# Patient Record
Sex: MALE | NOT HISPANIC OR LATINO | Employment: FULL TIME | URBAN - METROPOLITAN AREA
[De-identification: names, ages, dates, MRNs, and addresses within clinical notes are randomized per-mention and may not be internally consistent; named-entity substitution may affect disease eponyms.]

---

## 2023-02-10 PROBLEM — Z95.1 HISTORY OF CORONARY ARTERY BYPASS SURGERY: Status: ACTIVE | Noted: 2023-02-10

## 2023-02-10 PROBLEM — E78.5 HYPERLIPIDEMIA: Status: ACTIVE | Noted: 2023-02-10

## 2023-02-10 PROBLEM — U07.1 LAB TEST POSITIVE FOR DETECTION OF COVID-19 VIRUS: Status: ACTIVE | Noted: 2023-02-10

## 2023-02-10 PROBLEM — I25.10 CAD, MULTIPLE VESSEL: Status: ACTIVE | Noted: 2023-02-10

## 2023-02-10 PROBLEM — D53.9 ANEMIA, DEFICIENCY: Status: ACTIVE | Noted: 2023-02-10

## 2023-02-10 PROBLEM — M70.20 BURSITIS, OLECRANON: Status: ACTIVE | Noted: 2023-02-10

## 2023-02-10 PROBLEM — R93.1 HIGH CORONARY ARTERY CALCIUM SCORE: Status: ACTIVE | Noted: 2023-02-10

## 2023-02-10 PROBLEM — E11.9 DIABETES MELLITUS (MULTI): Status: ACTIVE | Noted: 2023-02-10

## 2023-02-10 PROBLEM — I10 BENIGN ESSENTIAL HTN: Status: ACTIVE | Noted: 2023-02-10

## 2023-02-10 PROBLEM — R09.82 POSTNASAL DRIP: Status: ACTIVE | Noted: 2023-02-10

## 2023-02-10 PROBLEM — I82.433 ACUTE DEEP VEIN THROMBOSIS (DVT) OF POPLITEAL VEIN OF BOTH LOWER EXTREMITIES (MULTI): Status: ACTIVE | Noted: 2023-02-10

## 2023-02-10 PROBLEM — E87.1 HYPONATREMIA: Status: ACTIVE | Noted: 2023-02-10

## 2023-02-10 PROBLEM — M79.89 SWELLING OF RIGHT LOWER EXTREMITY: Status: ACTIVE | Noted: 2023-02-10

## 2023-02-10 PROBLEM — J30.9 ALLERGIC RHINITIS: Status: ACTIVE | Noted: 2023-02-10

## 2023-02-10 PROBLEM — E08.01: Status: ACTIVE | Noted: 2023-02-10

## 2023-02-10 PROBLEM — M10.9 GOUT: Status: ACTIVE | Noted: 2023-02-10

## 2023-02-10 RX ORDER — METOPROLOL TARTRATE 25 MG/1
1 TABLET, FILM COATED ORAL DAILY
COMMUNITY
End: 2023-03-06 | Stop reason: SDUPTHER

## 2023-02-10 RX ORDER — ATORVASTATIN CALCIUM 40 MG/1
1 TABLET, FILM COATED ORAL DAILY
COMMUNITY
Start: 2021-09-21 | End: 2023-03-06 | Stop reason: SDUPTHER

## 2023-02-10 RX ORDER — ASPIRIN 81 MG/1
1 TABLET ORAL DAILY
COMMUNITY

## 2023-02-10 RX ORDER — LISINOPRIL 10 MG/1
1 TABLET ORAL DAILY
COMMUNITY
Start: 2018-01-16 | End: 2023-03-06 | Stop reason: SDUPTHER

## 2023-02-10 RX ORDER — METFORMIN HYDROCHLORIDE 1000 MG/1
1000 TABLET ORAL
COMMUNITY
Start: 2016-08-08 | End: 2023-03-06 | Stop reason: SDUPTHER

## 2023-02-27 LAB
ANION GAP IN SER/PLAS: 16 MMOL/L (ref 10–20)
CALCIUM (MG/DL) IN SER/PLAS: 10 MG/DL (ref 8.6–10.3)
CARBON DIOXIDE, TOTAL (MMOL/L) IN SER/PLAS: 26 MMOL/L (ref 21–32)
CHLORIDE (MMOL/L) IN SER/PLAS: 98 MMOL/L (ref 98–107)
CREATININE (MG/DL) IN SER/PLAS: 0.94 MG/DL (ref 0.5–1.3)
ESTIMATED AVERAGE GLUCOSE FOR HBA1C: 206 MG/DL
GFR MALE: 88 ML/MIN/1.73M2
GLUCOSE (MG/DL) IN SER/PLAS: 169 MG/DL (ref 74–99)
HEMOGLOBIN A1C/HEMOGLOBIN TOTAL IN BLOOD: 8.8 %
POTASSIUM (MMOL/L) IN SER/PLAS: 5 MMOL/L (ref 3.5–5.3)
SODIUM (MMOL/L) IN SER/PLAS: 135 MMOL/L (ref 136–145)
UREA NITROGEN (MG/DL) IN SER/PLAS: 16 MG/DL (ref 6–23)

## 2023-03-05 ENCOUNTER — DOCUMENTATION (OUTPATIENT)
Dept: PRIMARY CARE | Facility: CLINIC | Age: 68
End: 2023-03-05
Payer: MEDICARE

## 2023-03-06 ENCOUNTER — OFFICE VISIT (OUTPATIENT)
Dept: PRIMARY CARE | Facility: CLINIC | Age: 68
End: 2023-03-06
Payer: COMMERCIAL

## 2023-03-06 VITALS
DIASTOLIC BLOOD PRESSURE: 60 MMHG | WEIGHT: 125.8 LBS | OXYGEN SATURATION: 100 % | HEART RATE: 82 BPM | BODY MASS INDEX: 20.96 KG/M2 | SYSTOLIC BLOOD PRESSURE: 126 MMHG | HEIGHT: 65 IN

## 2023-03-06 DIAGNOSIS — I25.10 CAD, MULTIPLE VESSEL: ICD-10-CM

## 2023-03-06 DIAGNOSIS — Z00.00 MEDICARE ANNUAL WELLNESS VISIT, INITIAL: Primary | ICD-10-CM

## 2023-03-06 DIAGNOSIS — Z13.0 SCREENING, ANEMIA, DEFICIENCY, IRON: ICD-10-CM

## 2023-03-06 DIAGNOSIS — E78.5 HYPERLIPIDEMIA, UNSPECIFIED HYPERLIPIDEMIA TYPE: ICD-10-CM

## 2023-03-06 DIAGNOSIS — I10 BENIGN ESSENTIAL HTN: ICD-10-CM

## 2023-03-06 DIAGNOSIS — Z00.00 MEDICARE ANNUAL WELLNESS VISIT, SUBSEQUENT: ICD-10-CM

## 2023-03-06 DIAGNOSIS — E11.49 OTHER DIABETIC NEUROLOGICAL COMPLICATION ASSOCIATED WITH TYPE 2 DIABETES MELLITUS (MULTI): ICD-10-CM

## 2023-03-06 PROBLEM — R09.82 POSTNASAL DRIP: Status: RESOLVED | Noted: 2023-02-10 | Resolved: 2023-03-06

## 2023-03-06 PROBLEM — E87.1 HYPONATREMIA: Status: RESOLVED | Noted: 2023-02-10 | Resolved: 2023-03-06

## 2023-03-06 PROBLEM — M70.20 BURSITIS, OLECRANON: Status: RESOLVED | Noted: 2023-02-10 | Resolved: 2023-03-06

## 2023-03-06 PROBLEM — E11.40 DIABETIC NEUROPATHY ASSOCIATED WITH TYPE 2 DIABETES MELLITUS (MULTI): Status: ACTIVE | Noted: 2023-03-06

## 2023-03-06 PROBLEM — M79.89 SWELLING OF RIGHT LOWER EXTREMITY: Status: RESOLVED | Noted: 2023-02-10 | Resolved: 2023-03-06

## 2023-03-06 PROBLEM — D53.9 ANEMIA, DEFICIENCY: Status: RESOLVED | Noted: 2023-02-10 | Resolved: 2023-03-06

## 2023-03-06 PROBLEM — E08.01: Status: RESOLVED | Noted: 2023-02-10 | Resolved: 2023-03-06

## 2023-03-06 PROBLEM — M10.9 GOUT: Status: RESOLVED | Noted: 2023-02-10 | Resolved: 2023-03-06

## 2023-03-06 PROBLEM — I82.433 ACUTE DEEP VEIN THROMBOSIS (DVT) OF POPLITEAL VEIN OF BOTH LOWER EXTREMITIES (MULTI): Status: RESOLVED | Noted: 2023-02-10 | Resolved: 2023-03-06

## 2023-03-06 PROCEDURE — 3066F NEPHROPATHY DOC TX: CPT | Performed by: INTERNAL MEDICINE

## 2023-03-06 PROCEDURE — 1159F MED LIST DOCD IN RCRD: CPT | Performed by: INTERNAL MEDICINE

## 2023-03-06 PROCEDURE — 3078F DIAST BP <80 MM HG: CPT | Performed by: INTERNAL MEDICINE

## 2023-03-06 PROCEDURE — 4010F ACE/ARB THERAPY RXD/TAKEN: CPT | Performed by: INTERNAL MEDICINE

## 2023-03-06 PROCEDURE — 1170F FXNL STATUS ASSESSED: CPT | Performed by: INTERNAL MEDICINE

## 2023-03-06 PROCEDURE — 1036F TOBACCO NON-USER: CPT | Performed by: INTERNAL MEDICINE

## 2023-03-06 PROCEDURE — 99215 OFFICE O/P EST HI 40 MIN: CPT | Performed by: INTERNAL MEDICINE

## 2023-03-06 PROCEDURE — 3052F HG A1C>EQUAL 8.0%<EQUAL 9.0%: CPT | Performed by: INTERNAL MEDICINE

## 2023-03-06 PROCEDURE — 3074F SYST BP LT 130 MM HG: CPT | Performed by: INTERNAL MEDICINE

## 2023-03-06 PROCEDURE — 1160F RVW MEDS BY RX/DR IN RCRD: CPT | Performed by: INTERNAL MEDICINE

## 2023-03-06 RX ORDER — ATORVASTATIN CALCIUM 40 MG/1
40 TABLET, FILM COATED ORAL DAILY
Qty: 180 TABLET | Refills: 2 | Status: SHIPPED | OUTPATIENT
Start: 2023-03-06 | End: 2023-11-13 | Stop reason: SDUPTHER

## 2023-03-06 RX ORDER — METFORMIN HYDROCHLORIDE 1000 MG/1
1000 TABLET ORAL
Qty: 180 TABLET | Refills: 2 | Status: SHIPPED | OUTPATIENT
Start: 2023-03-06 | End: 2023-06-07 | Stop reason: SDUPTHER

## 2023-03-06 RX ORDER — LISINOPRIL 10 MG/1
10 TABLET ORAL DAILY
Qty: 90 TABLET | Refills: 1 | Status: SHIPPED | OUTPATIENT
Start: 2023-03-06 | End: 2023-07-24 | Stop reason: SDUPTHER

## 2023-03-06 RX ORDER — METOPROLOL TARTRATE 25 MG/1
25 TABLET, FILM COATED ORAL 2 TIMES DAILY
Qty: 180 TABLET | Refills: 1 | Status: SHIPPED | OUTPATIENT
Start: 2023-03-06 | End: 2023-11-13 | Stop reason: SDUPTHER

## 2023-03-06 ASSESSMENT — ENCOUNTER SYMPTOMS
OCCASIONAL FEELINGS OF UNSTEADINESS: 0
LOSS OF SENSATION IN FEET: 0
DEPRESSION: 0

## 2023-03-08 ASSESSMENT — ENCOUNTER SYMPTOMS
ALLERGIC/IMMUNOLOGIC NEGATIVE: 1
EYES NEGATIVE: 1
GASTROINTESTINAL NEGATIVE: 1
ENDOCRINE NEGATIVE: 1
CONSTITUTIONAL NEGATIVE: 1
HEMATOLOGIC/LYMPHATIC NEGATIVE: 1
CARDIOVASCULAR NEGATIVE: 1
RESPIRATORY NEGATIVE: 1
NEUROLOGICAL NEGATIVE: 1
MUSCULOSKELETAL NEGATIVE: 1

## 2023-03-08 ASSESSMENT — ACTIVITIES OF DAILY LIVING (ADL)
JUDGMENT_ADEQUATE_SAFELY_COMPLETE_DAILY_ACTIVITIES: YES
PATIENT'S MEMORY ADEQUATE TO SAFELY COMPLETE DAILY ACTIVITIES?: YES
EATING: INDEPENDENT
DRESSING: INDEPENDENT
FEEDING YOURSELF: INDEPENDENT
BATHING: INDEPENDENT
DOING_HOUSEWORK: INDEPENDENT
TAKING_MEDICATION: INDEPENDENT
GROCERY_SHOPPING: INDEPENDENT
GROOMING: INDEPENDENT
TAKING_MEDICATION: INDEPENDENT
ADEQUATE_TO_COMPLETE_ADL: YES
WALKS IN HOME: INDEPENDENT
DOING_HOUSEWORK: INDEPENDENT
USING TRANSPORTATION: INDEPENDENT
BATHING: INDEPENDENT
TOILETING: INDEPENDENT
MANAGING_FINANCES: INDEPENDENT
GROCERY_SHOPPING: INDEPENDENT
DRESSING YOURSELF: INDEPENDENT
ADEQUATE_TO_COMPLETE_ADL: YES
JUDGMENT_ADEQUATE_SAFELY_COMPLETE_DAILY_ACTIVITIES: YES
STIL DRIVING: YES

## 2023-03-08 NOTE — PROGRESS NOTES
Advance Care Planning Note     Discussion Date: 03/08/23   Discussion Participants: patient    The patient wishes to discuss Advance Care Planning today and the following is a brief summary of our discussion.     Patient has capacity to make their own medical decisions: Yes  Health Care Agent/Surrogate Decision Maker documented in chart:  was not discussed with him on the visit    Documents on file and valid:  Advance Directive/Living Will: No   Health Care Power of : No  Other: na    Communication of Medical Status/Prognosis:   na     Communication of Treatment Goals/Options:   na     Treatment Decisions  na    Time Statement: Total face to face time spent on advance care planning was 0 minutes with 0 minutes spent in counseling, including the explanation.    Valentino Davila MD  3/8/2023 11:31 AM

## 2023-03-08 NOTE — PROGRESS NOTES
"Subjective   Reason for Visit: Daniele Livingston is an 67 y.o. male here for a Medicare Wellness visit.          Reviewed all medications by prescribing practitioner or clinical pharmacist (such as prescriptions, OTCs, herbal therapies and supplements) and documented in the medical record.    HPI    Here for subsequent Medicare wellness visit  He does not have any complain  He does not have any concern      Patient Self Assessment of Health Status  Patient Self Assessment: Excellent    Nutrition and Exercise  Current Diet: Well Balanced Diet  Adequate Fluid Intake: Yes  Caffeine: No  Exercise Frequency: Regularly    Functional Ability/Level of Safety  Cognitive Impairment Observed: No cognitive impairment observed    Home Safety Risk Factors: None    Patient Care Team:  Valentino Davila MD as PCP - General  Valentino Davila MD as PCP - [facility] PCP     Review of Systems   Constitutional: Negative.    HENT: Negative.     Eyes: Negative.    Respiratory: Negative.     Cardiovascular: Negative.    Gastrointestinal: Negative.    Endocrine: Negative.    Genitourinary: Negative.    Musculoskeletal: Negative.    Skin: Negative.    Allergic/Immunologic: Negative.    Neurological: Negative.    Hematological: Negative.    All other systems reviewed and are negative.      Objective   Vitals:  /60   Pulse 82   Ht 1.651 m (5' 5\")   Wt 57.1 kg (125 lb 12.8 oz)   SpO2 100%   BMI 20.93 kg/m²       Physical Exam  Vitals and nursing note reviewed.   Constitutional:       Appearance: Normal appearance.   HENT:      Head: Normocephalic and atraumatic.      Mouth/Throat:      Mouth: Mucous membranes are moist.      Pharynx: Oropharynx is clear.   Cardiovascular:      Rate and Rhythm: Normal rate and regular rhythm.      Pulses: Normal pulses.      Heart sounds: Normal heart sounds.   Pulmonary:      Effort: Pulmonary effort is normal.      Breath sounds: Normal breath sounds.   Abdominal:      General: Abdomen is flat. "   Musculoskeletal:         General: Normal range of motion.      Cervical back: Neck supple.   Skin:     General: Skin is warm.      Capillary Refill: Capillary refill takes more than 3 seconds.   Neurological:      General: No focal deficit present.      Mental Status: He is alert and oriented to person, place, and time.   Psychiatric:         Mood and Affect: Mood normal.         Behavior: Behavior normal.         Thought Content: Thought content normal.         Assessment/Plan   Problem List Items Addressed This Visit          Nervous    Diabetic neuropathy associated with type 2 diabetes mellitus (CMS/[facility])    Relevant Medications    empagliflozin (Jardiance) 25 mg    lisinopril 10 mg tablet    metFORMIN (Glucophage) 1,000 mg tablet    Other Relevant Orders    Comprehensive metabolic panel    Hemoglobin A1c    Albumin, urine, random       Circulatory    Benign essential HTN    Relevant Medications    lisinopril 10 mg tablet    Other Relevant Orders    Comprehensive metabolic panel    CAD, multiple vessel    Relevant Medications    atorvastatin (Lipitor) 40 mg tablet    lisinopril 10 mg tablet    metoprolol tartrate (Lopressor) 25 mg tablet       Other    Hyperlipidemia    Relevant Medications    atorvastatin (Lipitor) 40 mg tablet    Other Relevant Orders    Lipid panel     Other Visit Diagnoses       Medicare annual wellness visit, initial    -  Primary    Screening, anemia, deficiency, iron        Relevant Orders    CBC and Auto Differential    Medicare annual wellness visit, subsequent

## 2023-05-03 DIAGNOSIS — E11.49 OTHER DIABETIC NEUROLOGICAL COMPLICATION ASSOCIATED WITH TYPE 2 DIABETES MELLITUS (MULTI): ICD-10-CM

## 2023-05-03 NOTE — TELEPHONE ENCOUNTER
Pt. Informed. States he has a new pharmacy. It is [facility] on [address]. Asking if you can switch his medication to that pharmacy. Also states did blood work in February, but there are orders for 3/6/23. Which blood work would you like him to do?

## 2023-06-05 DIAGNOSIS — E11.49 OTHER DIABETIC NEUROLOGICAL COMPLICATION ASSOCIATED WITH TYPE 2 DIABETES MELLITUS (MULTI): ICD-10-CM

## 2023-06-07 DIAGNOSIS — E11.49 OTHER DIABETIC NEUROLOGICAL COMPLICATION ASSOCIATED WITH TYPE 2 DIABETES MELLITUS (MULTI): ICD-10-CM

## 2023-06-07 RX ORDER — METFORMIN HYDROCHLORIDE 1000 MG/1
1000 TABLET ORAL
Qty: 180 TABLET | Refills: 2 | Status: SHIPPED | OUTPATIENT
Start: 2023-06-07 | End: 2023-06-08 | Stop reason: SDUPTHER

## 2023-06-08 DIAGNOSIS — E11.49 OTHER DIABETIC NEUROLOGICAL COMPLICATION ASSOCIATED WITH TYPE 2 DIABETES MELLITUS (MULTI): ICD-10-CM

## 2023-06-08 RX ORDER — METFORMIN HYDROCHLORIDE 1000 MG/1
1000 TABLET ORAL
Qty: 180 TABLET | Refills: 2 | Status: SHIPPED | OUTPATIENT
Start: 2023-06-08 | End: 2023-06-14 | Stop reason: SDUPTHER

## 2023-06-11 RX ORDER — METFORMIN HYDROCHLORIDE 1000 MG/1
TABLET ORAL
Qty: 225 TABLET | Refills: 0 | Status: SHIPPED | OUTPATIENT
Start: 2023-06-11 | End: 2023-06-14 | Stop reason: SDUPTHER

## 2023-06-14 DIAGNOSIS — E11.49 OTHER DIABETIC NEUROLOGICAL COMPLICATION ASSOCIATED WITH TYPE 2 DIABETES MELLITUS (MULTI): ICD-10-CM

## 2023-06-14 RX ORDER — METFORMIN HYDROCHLORIDE 1000 MG/1
1000 TABLET ORAL SEE ADMIN INSTRUCTIONS
Qty: 180 TABLET | Refills: 2 | Status: SHIPPED | OUTPATIENT
Start: 2023-06-14 | End: 2024-03-27 | Stop reason: SDUPTHER

## 2023-06-14 RX ORDER — METFORMIN HYDROCHLORIDE 1000 MG/1
1000 TABLET ORAL
Qty: 180 TABLET | Refills: 1 | Status: SHIPPED | OUTPATIENT
Start: 2023-06-14 | End: 2023-11-09 | Stop reason: SDUPTHER

## 2023-07-24 DIAGNOSIS — E11.49 OTHER DIABETIC NEUROLOGICAL COMPLICATION ASSOCIATED WITH TYPE 2 DIABETES MELLITUS (MULTI): ICD-10-CM

## 2023-07-24 DIAGNOSIS — I10 BENIGN ESSENTIAL HTN: ICD-10-CM

## 2023-07-24 DIAGNOSIS — I25.10 CAD, MULTIPLE VESSEL: ICD-10-CM

## 2023-07-24 RX ORDER — LISINOPRIL 10 MG/1
10 TABLET ORAL DAILY
Qty: 90 TABLET | Refills: 1 | Status: SHIPPED | OUTPATIENT
Start: 2023-07-24 | End: 2023-11-13 | Stop reason: SDUPTHER

## 2023-11-09 DIAGNOSIS — E11.49 OTHER DIABETIC NEUROLOGICAL COMPLICATION ASSOCIATED WITH TYPE 2 DIABETES MELLITUS (MULTI): ICD-10-CM

## 2023-11-09 RX ORDER — METFORMIN HYDROCHLORIDE 1000 MG/1
1000 TABLET ORAL
Qty: 60 TABLET | Refills: 0 | Status: SHIPPED | OUTPATIENT
Start: 2023-11-09 | End: 2023-11-13 | Stop reason: SDUPTHER

## 2023-11-10 ENCOUNTER — LAB (OUTPATIENT)
Dept: LAB | Facility: LAB | Age: 68
End: 2023-11-10
Payer: MEDICARE

## 2023-11-10 DIAGNOSIS — Z13.0 SCREENING, ANEMIA, DEFICIENCY, IRON: ICD-10-CM

## 2023-11-10 DIAGNOSIS — E11.49 OTHER DIABETIC NEUROLOGICAL COMPLICATION ASSOCIATED WITH TYPE 2 DIABETES MELLITUS (MULTI): ICD-10-CM

## 2023-11-10 DIAGNOSIS — E78.5 HYPERLIPIDEMIA, UNSPECIFIED HYPERLIPIDEMIA TYPE: ICD-10-CM

## 2023-11-10 DIAGNOSIS — I10 BENIGN ESSENTIAL HTN: ICD-10-CM

## 2023-11-10 LAB
ALBUMIN SERPL BCP-MCNC: 4.3 G/DL (ref 3.4–5)
ALP SERPL-CCNC: 92 U/L (ref 33–136)
ALT SERPL W P-5'-P-CCNC: 24 U/L (ref 10–52)
ANION GAP SERPL CALC-SCNC: 15 MMOL/L (ref 10–20)
AST SERPL W P-5'-P-CCNC: 11 U/L (ref 9–39)
BASOPHILS # BLD AUTO: 0.13 X10*3/UL (ref 0–0.1)
BASOPHILS NFR BLD AUTO: 1.7 %
BILIRUB SERPL-MCNC: 0.5 MG/DL (ref 0–1.2)
BUN SERPL-MCNC: 16 MG/DL (ref 6–23)
CALCIUM SERPL-MCNC: 9.6 MG/DL (ref 8.6–10.3)
CHLORIDE SERPL-SCNC: 99 MMOL/L (ref 98–107)
CHOLEST SERPL-MCNC: 145 MG/DL (ref 0–199)
CHOLESTEROL/HDL RATIO: 3.6
CO2 SERPL-SCNC: 28 MMOL/L (ref 21–32)
CREAT SERPL-MCNC: 0.89 MG/DL (ref 0.5–1.3)
CREAT UR-MCNC: 60 MG/DL (ref 20–370)
EOSINOPHIL # BLD AUTO: 0.75 X10*3/UL (ref 0–0.7)
EOSINOPHIL NFR BLD AUTO: 9.7 %
ERYTHROCYTE [DISTWIDTH] IN BLOOD BY AUTOMATED COUNT: 13 % (ref 11.5–14.5)
EST. AVERAGE GLUCOSE BLD GHB EST-MCNC: 203 MG/DL
GFR SERPL CREATININE-BSD FRML MDRD: >90 ML/MIN/1.73M*2
GLUCOSE SERPL-MCNC: 176 MG/DL (ref 74–99)
HBA1C MFR BLD: 8.7 %
HCT VFR BLD AUTO: 47.7 % (ref 41–52)
HDLC SERPL-MCNC: 40.4 MG/DL
HGB BLD-MCNC: 15.3 G/DL (ref 13.5–17.5)
IMM GRANULOCYTES # BLD AUTO: 0.03 X10*3/UL (ref 0–0.7)
IMM GRANULOCYTES NFR BLD AUTO: 0.4 % (ref 0–0.9)
LDLC SERPL CALC-MCNC: 80 MG/DL
LYMPHOCYTES # BLD AUTO: 1.98 X10*3/UL (ref 1.2–4.8)
LYMPHOCYTES NFR BLD AUTO: 25.7 %
MCH RBC QN AUTO: 29.4 PG (ref 26–34)
MCHC RBC AUTO-ENTMCNC: 32.1 G/DL (ref 32–36)
MCV RBC AUTO: 92 FL (ref 80–100)
MICROALBUMIN UR-MCNC: <7 MG/L
MICROALBUMIN/CREAT UR: NORMAL MG/G{CREAT}
MONOCYTES # BLD AUTO: 0.76 X10*3/UL (ref 0.1–1)
MONOCYTES NFR BLD AUTO: 9.9 %
NEUTROPHILS # BLD AUTO: 4.06 X10*3/UL (ref 1.2–7.7)
NEUTROPHILS NFR BLD AUTO: 52.6 %
NON HDL CHOLESTEROL: 105 MG/DL (ref 0–149)
NRBC BLD-RTO: 0 /100 WBCS (ref 0–0)
PLATELET # BLD AUTO: 353 X10*3/UL (ref 150–450)
POTASSIUM SERPL-SCNC: 5 MMOL/L (ref 3.5–5.3)
PROT SERPL-MCNC: 6.8 G/DL (ref 6.4–8.2)
RBC # BLD AUTO: 5.2 X10*6/UL (ref 4.5–5.9)
SODIUM SERPL-SCNC: 137 MMOL/L (ref 136–145)
TRIGL SERPL-MCNC: 121 MG/DL (ref 0–149)
VLDL: 24 MG/DL (ref 0–40)
WBC # BLD AUTO: 7.7 X10*3/UL (ref 4.4–11.3)

## 2023-11-10 PROCEDURE — 82043 UR ALBUMIN QUANTITATIVE: CPT

## 2023-11-10 PROCEDURE — 83036 HEMOGLOBIN GLYCOSYLATED A1C: CPT

## 2023-11-10 PROCEDURE — 82570 ASSAY OF URINE CREATININE: CPT

## 2023-11-10 PROCEDURE — 85025 COMPLETE CBC W/AUTO DIFF WBC: CPT

## 2023-11-10 PROCEDURE — 36415 COLL VENOUS BLD VENIPUNCTURE: CPT

## 2023-11-10 PROCEDURE — 80061 LIPID PANEL: CPT

## 2023-11-10 PROCEDURE — 80053 COMPREHEN METABOLIC PANEL: CPT

## 2023-11-13 ENCOUNTER — OFFICE VISIT (OUTPATIENT)
Dept: PRIMARY CARE | Facility: CLINIC | Age: 68
End: 2023-11-13
Payer: MEDICARE

## 2023-11-13 VITALS
BODY MASS INDEX: 21 KG/M2 | OXYGEN SATURATION: 100 % | DIASTOLIC BLOOD PRESSURE: 50 MMHG | SYSTOLIC BLOOD PRESSURE: 110 MMHG | WEIGHT: 126.2 LBS | HEART RATE: 57 BPM

## 2023-11-13 DIAGNOSIS — I25.10 CAD, MULTIPLE VESSEL: ICD-10-CM

## 2023-11-13 DIAGNOSIS — E78.5 HYPERLIPIDEMIA, UNSPECIFIED HYPERLIPIDEMIA TYPE: Primary | ICD-10-CM

## 2023-11-13 DIAGNOSIS — E11.49 OTHER DIABETIC NEUROLOGICAL COMPLICATION ASSOCIATED WITH TYPE 2 DIABETES MELLITUS (MULTI): ICD-10-CM

## 2023-11-13 DIAGNOSIS — Z95.1 HISTORY OF CORONARY ARTERY BYPASS SURGERY: ICD-10-CM

## 2023-11-13 DIAGNOSIS — I10 BENIGN ESSENTIAL HTN: ICD-10-CM

## 2023-11-13 PROCEDURE — 3066F NEPHROPATHY DOC TX: CPT | Performed by: INTERNAL MEDICINE

## 2023-11-13 PROCEDURE — 3078F DIAST BP <80 MM HG: CPT | Performed by: INTERNAL MEDICINE

## 2023-11-13 PROCEDURE — 99213 OFFICE O/P EST LOW 20 MIN: CPT | Performed by: INTERNAL MEDICINE

## 2023-11-13 PROCEDURE — 3062F POS MACROALBUMINURIA REV: CPT | Performed by: INTERNAL MEDICINE

## 2023-11-13 PROCEDURE — 1159F MED LIST DOCD IN RCRD: CPT | Performed by: INTERNAL MEDICINE

## 2023-11-13 PROCEDURE — 1126F AMNT PAIN NOTED NONE PRSNT: CPT | Performed by: INTERNAL MEDICINE

## 2023-11-13 PROCEDURE — 4010F ACE/ARB THERAPY RXD/TAKEN: CPT | Performed by: INTERNAL MEDICINE

## 2023-11-13 PROCEDURE — 3048F LDL-C <100 MG/DL: CPT | Performed by: INTERNAL MEDICINE

## 2023-11-13 PROCEDURE — 1036F TOBACCO NON-USER: CPT | Performed by: INTERNAL MEDICINE

## 2023-11-13 PROCEDURE — 1160F RVW MEDS BY RX/DR IN RCRD: CPT | Performed by: INTERNAL MEDICINE

## 2023-11-13 PROCEDURE — 3052F HG A1C>EQUAL 8.0%<EQUAL 9.0%: CPT | Performed by: INTERNAL MEDICINE

## 2023-11-13 PROCEDURE — 3074F SYST BP LT 130 MM HG: CPT | Performed by: INTERNAL MEDICINE

## 2023-11-13 RX ORDER — METFORMIN HYDROCHLORIDE 1000 MG/1
1000 TABLET ORAL
Qty: 180 TABLET | Refills: 2 | Status: SHIPPED | OUTPATIENT
Start: 2023-11-13

## 2023-11-13 RX ORDER — LISINOPRIL 10 MG/1
10 TABLET ORAL DAILY
Qty: 90 TABLET | Refills: 2 | Status: SHIPPED | OUTPATIENT
Start: 2023-11-13

## 2023-11-13 RX ORDER — METOPROLOL TARTRATE 25 MG/1
25 TABLET, FILM COATED ORAL 2 TIMES DAILY
Qty: 180 TABLET | Refills: 2 | Status: SHIPPED | OUTPATIENT
Start: 2023-11-13

## 2023-11-13 RX ORDER — ATORVASTATIN CALCIUM 40 MG/1
40 TABLET, FILM COATED ORAL DAILY
Qty: 90 TABLET | Refills: 2 | Status: SHIPPED | OUTPATIENT
Start: 2023-11-13

## 2023-11-13 RX ORDER — GLIMEPIRIDE 4 MG/1
4 TABLET ORAL
Qty: 90 TABLET | Refills: 3 | Status: SHIPPED | OUTPATIENT
Start: 2023-11-13 | End: 2024-11-12

## 2023-11-13 ASSESSMENT — PATIENT HEALTH QUESTIONNAIRE - PHQ9
2. FEELING DOWN, DEPRESSED OR HOPELESS: NOT AT ALL
SUM OF ALL RESPONSES TO PHQ9 QUESTIONS 1 AND 2: 0
2. FEELING DOWN, DEPRESSED OR HOPELESS: NOT AT ALL
1. LITTLE INTEREST OR PLEASURE IN DOING THINGS: NOT AT ALL
1. LITTLE INTEREST OR PLEASURE IN DOING THINGS: NOT AT ALL
SUM OF ALL RESPONSES TO PHQ9 QUESTIONS 1 AND 2: 0

## 2023-11-13 ASSESSMENT — ENCOUNTER SYMPTOMS
POLYDIPSIA: 0
FATIGUE: 0
VISUAL CHANGE: 0
CONSTITUTIONAL NEGATIVE: 1
LOSS OF SENSATION IN FEET: 0
DIABETIC ASSOCIATED SYMPTOMS: 0
OCCASIONAL FEELINGS OF UNSTEADINESS: 0
DEPRESSION: 0

## 2023-11-13 NOTE — PROGRESS NOTES
Patient ID: Daniele Livingston is a 67 y.o. male who presents for Medication Problem (And refill).    /50   Pulse 57   Wt 57.2 kg (126 lb 3.2 oz)   SpO2 100%   BMI 21.00 kg/m²     Diabetes  He presents for his follow-up diabetic visit. He has type 2 diabetes mellitus. No MedicAlert identification noted. Onset time: 10 YEARS. His disease course has been stable. There are no hypoglycemic associated symptoms. There are no diabetic associated symptoms. Pertinent negatives for diabetes include no fatigue, no foot paresthesias, no foot ulcerations, no polydipsia, no polyuria and no visual change. Symptoms are stable. Diabetic complications include heart disease. Pertinent negatives for diabetic complications include no autonomic neuropathy, CVA, impotence, nephropathy, peripheral neuropathy, PVD or retinopathy. Risk factors for coronary artery disease include male sex, dyslipidemia and diabetes mellitus. Current diabetic treatment includes oral agent (dual therapy) and oral agent (triple therapy). He is compliant with treatment all of the time. He is following a diabetic diet. When asked about meal planning, he reported none. He has not had a previous visit with a dietitian. He participates in exercise daily. His home blood glucose trend is fluctuating dramatically. An ACE inhibitor/angiotensin II receptor blocker is being taken. He does not see a podiatrist.Eye exam is not current.             Subjective     Review of Systems   Constitutional: Negative.  Negative for fatigue.   Endocrine: Negative for polydipsia and polyuria.   Genitourinary:  Negative for impotence.   All other systems reviewed and are negative.      Objective     Physical Exam  Vitals and nursing note reviewed.   Neck:      Vascular: No carotid bruit.   Cardiovascular:      Rate and Rhythm: Normal rate and regular rhythm.      Pulses: Normal pulses.      Heart sounds: Normal heart sounds.   Pulmonary:      Effort: Pulmonary effort is normal.       Breath sounds: Normal breath sounds.   Musculoskeletal:      Right lower leg: No edema.      Left lower leg: No edema.   Lymphadenopathy:      Cervical: No cervical adenopathy.   Skin:     Capillary Refill: Capillary refill takes more than 3 seconds.   Neurological:      General: No focal deficit present.      Mental Status: He is oriented to person, place, and time. Mental status is at baseline.   Psychiatric:         Mood and Affect: Mood normal.         Behavior: Behavior normal.         Thought Content: Thought content normal.         Judgment: Judgment normal.         Lab Results   Component Value Date    WBC 7.7 11/10/2023    HGB 15.3 11/10/2023    HCT 47.7 11/10/2023    MCV 92 11/10/2023     11/10/2023           Problem List Items Addressed This Visit       Benign essential HTN    CAD, multiple vessel    History of coronary artery bypass surgery    Hyperlipidemia - Primary    Diabetic neuropathy associated with type 2 diabetes mellitus (CMS/HCC)            A/P       ATORVASTATIN 40MG 1 PILL EVERY DAY FILLED  GLIMEPIRIDE 4MG 1 PILL EVERY DAY FILLED  JARDIANCE 25MG 1 PILL EVERY DAY FILLED  METOPROLOL 25MG 1 PILL BID  FILLED  LISINOPRIL 10MG 1 PILL EVERY DAY FILLED  METFORMIN 1000MG 1 PILL BID FILLED   A1C TO REPEAT ON 03/13/2024           Advance Care Planning Note     Discussion Date: 11/13/23   Discussion Participants: patient    The patient wishes to discuss Advance Care Planning today and the following is a brief summary of our discussion.     Patient has capacity to make their own medical decisions: Yes  Health Care Agent/Surrogate Decision Maker documented in chart: No    Documents on file and valid:  Advance Directive/Living Will: No   Health Care Power of : No  Other:     Communication of Medical Status/Prognosis:        Communication of Treatment Goals/Options:         Discussed with the patient end-of-life choices, patient is DNR if he is terminally ill or sick with poor bad outcome he  does have a living will but he did not bring it with him I told him he needs to get it here next time when he is coming in so that we can scan it into the chart for the purpose of documentation    Treatment Decisions  Goals of Care: comfort is paramount; other goals are not relevant or achievable     Follow Up Plan      Team Members      Time Statement: Total face to face time spent on advance care planning was 5 minutes with 5 minutes spent in counseling, including the explanation.    Valentino Davila MD  11/13/2023 9:33 AM

## 2024-03-13 ENCOUNTER — LAB (OUTPATIENT)
Dept: LAB | Facility: LAB | Age: 69
End: 2024-03-13
Payer: MEDICARE

## 2024-03-13 DIAGNOSIS — E08.65 DIABETES MELLITUS DUE TO UNDERLYING CONDITION, UNCONTROLLED, WITH HYPERGLYCEMIA (MULTI): Primary | ICD-10-CM

## 2024-03-13 DIAGNOSIS — E11.49 OTHER DIABETIC NEUROLOGICAL COMPLICATION ASSOCIATED WITH TYPE 2 DIABETES MELLITUS (MULTI): ICD-10-CM

## 2024-03-13 LAB
EST. AVERAGE GLUCOSE BLD GHB EST-MCNC: 192 MG/DL
HBA1C MFR BLD: 8.3 %

## 2024-03-13 PROCEDURE — 36415 COLL VENOUS BLD VENIPUNCTURE: CPT

## 2024-03-13 PROCEDURE — 83036 HEMOGLOBIN GLYCOSYLATED A1C: CPT

## 2024-03-18 DIAGNOSIS — E11.69 TYPE 2 DIABETES MELLITUS WITH OTHER SPECIFIED COMPLICATION, WITHOUT LONG-TERM CURRENT USE OF INSULIN (MULTI): Primary | ICD-10-CM

## 2024-03-18 DIAGNOSIS — Z12.5 SCREENING FOR PROSTATE CANCER: Primary | ICD-10-CM

## 2024-03-18 DIAGNOSIS — I25.810 CORONARY ARTERY DISEASE INVOLVING CORONARY BYPASS GRAFT OF NATIVE HEART WITHOUT ANGINA PECTORIS: ICD-10-CM

## 2024-03-27 ENCOUNTER — TELEMEDICINE (OUTPATIENT)
Dept: PHARMACY | Facility: HOSPITAL | Age: 69
End: 2024-03-27
Payer: COMMERCIAL

## 2024-03-27 DIAGNOSIS — I25.810 CORONARY ARTERY DISEASE INVOLVING CORONARY BYPASS GRAFT OF NATIVE HEART WITHOUT ANGINA PECTORIS: ICD-10-CM

## 2024-03-27 DIAGNOSIS — E11.69 TYPE 2 DIABETES MELLITUS WITH OTHER SPECIFIED COMPLICATION, WITHOUT LONG-TERM CURRENT USE OF INSULIN (MULTI): ICD-10-CM

## 2024-03-27 NOTE — ASSESSMENT & PLAN NOTE
Patient's diabetes needs improvement with most recent A1c of 8.3% (Goal < 7%). Patient reports household income above [facility] limits and unfortunately does not qualify for assistance. Patient would like to explore other medication options to help with diabetes control. Discussed GLP-1 receptor agonists vs DPP-4 inhibitors. Recommended Rybelsus over injectables as weight loss potential is less and patient does not need to lose weight. If unable to tolerate Rybelsus or experience substantial weight loss, then DPP-4 inhibitor will be tried next.  Initiate: Rybelsus 3 mg  Continue: metformin 1000 mg twice daily, Jardiance 25 mg daily, glimepiride 4 mg daily  Compliance at present is estimated to be good.   Education Provided to Patient: Rybelsus administration and potential side effects.   Follow-up: 4/17/24  PCP Follow-Up: YOLIE

## 2024-03-27 NOTE — PROGRESS NOTES
Subjective   Patient ID: Daniele Livingston is a 68 y.o. male who presents for Diabetes.    Referring Provider: Valentino Davila MD     Diabetes  He presents for his initial diabetic visit. He has type 2 diabetes mellitus. Current diabetic treatment includes oral agent (triple therapy). He is compliant with treatment all of the time. He participates in exercise every other day. There is no change in his home blood glucose trend.     FB mg/dL today, usually 160-180 mg/dL    Denies history of pancreatitis and MTC.    Review of Systems    Medication System Management:  Affordability/Accessibility: Jardiance before meeting deductible.   Adherence/Organization: No issues  Adverse Effects: Weight loss with metformin    Objective     There were no vitals taken for this visit.     Labs  Lab Results   Component Value Date    BILITOT 0.5 11/10/2023    CALCIUM 9.6 11/10/2023    CO2 28 11/10/2023    CL 99 11/10/2023    CREATININE 0.89 11/10/2023    GLUCOSE 176 (H) 11/10/2023    ALKPHOS 92 11/10/2023    K 5.0 11/10/2023    PROT 6.8 11/10/2023     11/10/2023    AST 11 11/10/2023    ALT 24 11/10/2023    BUN 16 11/10/2023    ANIONGAP 15 11/10/2023    MG 2.30 2018    PHOS 2.1 (L) 2018    ALBUMIN 4.3 11/10/2023    GFRMALE 88 2023     Lab Results   Component Value Date    TRIG 121 11/10/2023    CHOL 145 11/10/2023    LDLCALC 80 11/10/2023    HDL 40.4 11/10/2023     Lab Results   Component Value Date    HGBA1C 8.3 (H) 2024       Current Outpatient Medications on File Prior to Visit   Medication Sig Dispense Refill    aspirin 81 mg EC tablet Take 1 tablet (81 mg) by mouth once daily.      atorvastatin (Lipitor) 40 mg tablet Take 1 tablet (40 mg) by mouth once daily. 90 tablet 2    empagliflozin (Jardiance) 25 mg Take 1 tablet (25 mg) by mouth once daily. 90 tablet 2    glimepiride (AmaryL) 4 mg tablet Take 1 tablet (4 mg) by mouth once daily in the morning. Take before meals. 90 tablet 3    lisinopril  10 mg tablet Take 1 tablet (10 mg) by mouth once daily. 90 tablet 2    metFORMIN (Glucophage) 1,000 mg tablet Take 1 tablet (1,000 mg) by mouth 2 times a day with meals. 180 tablet 2    metoprolol tartrate (Lopressor) 25 mg tablet Take 1 tablet (25 mg) by mouth 2 times a day. 180 tablet 2    [DISCONTINUED] metFORMIN (Glucophage) 1,000 mg tablet Take 1 tablet (1,000 mg) by mouth see administration instructions. TAKE 1 TABLET IN AM AND 1 TAB IN PM with meal please note this is the  updated prescription 180 tablet 2     No current facility-administered medications on file prior to visit.        Assessment/Plan   Problem List Items Addressed This Visit             ICD-10-CM    Diabetes mellitus (CMS/[facility]) E11.9     Patient's diabetes needs improvement with most recent A1c of 8.3% (Goal < 7%). Patient reports household income above [facility] limits and unfortunately does not qualify for assistance. Patient would like to explore other medication options to help with diabetes control. Discussed GLP-1 receptor agonists vs DPP-4 inhibitors. Recommended Rybelsus over injectables as weight loss potential is less and patient does not need to lose weight. If unable to tolerate Rybelsus or experience substantial weight loss, then DPP-4 inhibitor will be tried next.  Initiate: Rybelsus 3 mg  Continue: metformin 1000 mg twice daily, Jardiance 25 mg daily, glimepiride 4 mg daily  Compliance at present is estimated to be good.   Education Provided to Patient: Rybelsus administration and potential side effects.   Follow-up: 4/17/24  PCP Follow-Up: TBD         Relevant Medications    semaglutide (Rybelsus) 3 mg tablet    Other Relevant Orders    Follow Up In Advanced Primary Care - Pharmacy     Other Visit Diagnoses         Codes    Coronary artery disease involving coronary bypass graft of native heart without angina pectoris     I25.810            Vira Vicente, PharmD    Continue all meds under the continuation of care with the  referring provider and clinical pharmacy team.    Verbal consent to manage patient's drug therapy was obtained from the patient. They were informed they may decline to participate or withdraw from participation in pharmacy services at any time.

## 2024-04-04 ENCOUNTER — LAB (OUTPATIENT)
Dept: LAB | Facility: LAB | Age: 69
End: 2024-04-04
Payer: MEDICARE

## 2024-04-04 DIAGNOSIS — Z12.5 SCREENING FOR PROSTATE CANCER: ICD-10-CM

## 2024-04-04 LAB — PSA SERPL-MCNC: 1.32 NG/ML

## 2024-04-04 PROCEDURE — G0103 PSA SCREENING: HCPCS

## 2024-04-04 PROCEDURE — 36415 COLL VENOUS BLD VENIPUNCTURE: CPT

## 2024-04-17 ENCOUNTER — TELEMEDICINE (OUTPATIENT)
Dept: PHARMACY | Facility: HOSPITAL | Age: 69
End: 2024-04-17
Payer: COMMERCIAL

## 2024-04-17 DIAGNOSIS — E11.69 TYPE 2 DIABETES MELLITUS WITH OTHER SPECIFIED COMPLICATION, WITHOUT LONG-TERM CURRENT USE OF INSULIN (MULTI): ICD-10-CM

## 2024-04-17 RX ORDER — BISMUTH SUBSALICYLATE 262 MG
1 TABLET,CHEWABLE ORAL DAILY
COMMUNITY

## 2024-04-17 NOTE — PROGRESS NOTES
Subjective   Patient ID: Daniele Livingston is a 68 y.o. male who presents for Diabetes.    Referring Provider: Valentino Davila MD     Diabetes  He presents for his follow-up diabetic visit. He has type 2 diabetes mellitus. Current diabetic treatment includes oral agent (triple therapy). He is compliant with treatment all of the time. He participates in exercise every other day. There is no change in his home blood glucose trend.     FB mg/dL today, usually 160-180 mg/dL    Patient has evening snacks less frequently.    Denies history of pancreatitis and MTC.    Patient previously reported household income above  PAP limits and does not qualify for cost assistance with [employer].    Review of Systems    Medication System Management:  Affordability/Accessibility: Jardiance before meeting deductible.   Adherence/Organization: No issues  Adverse Effects: Weight loss with metformin    Objective     There were no vitals taken for this visit.     Labs  Lab Results   Component Value Date    BILITOT 0.5 11/10/2023    CALCIUM 9.6 11/10/2023    CO2 28 11/10/2023    CL 99 11/10/2023    CREATININE 0.89 11/10/2023    GLUCOSE 176 (H) 11/10/2023    ALKPHOS 92 11/10/2023    K 5.0 11/10/2023    PROT 6.8 11/10/2023     11/10/2023    AST 11 11/10/2023    ALT 24 11/10/2023    BUN 16 11/10/2023    ANIONGAP 15 11/10/2023    MG 2.30 2018    PHOS 2.1 (L) 2018    ALBUMIN 4.3 11/10/2023    GFRMALE 88 2023     Lab Results   Component Value Date    TRIG 121 11/10/2023    CHOL 145 11/10/2023    LDLCALC 80 11/10/2023    HDL 40.4 11/10/2023     Lab Results   Component Value Date    HGBA1C 8.3 (H) 2024       Current Outpatient Medications on File Prior to Visit   Medication Sig Dispense Refill    aspirin 81 mg EC tablet Take 1 tablet (81 mg) by mouth once daily.      atorvastatin (Lipitor) 40 mg tablet Take 1 tablet (40 mg) by mouth once daily. 90 tablet 2    empagliflozin (Jardiance) 25 mg Take 1 tablet (25  mg) by mouth once daily. 90 tablet 2    glimepiride (AmaryL) 4 mg tablet Take 1 tablet (4 mg) by mouth once daily in the morning. Take before meals. 90 tablet 3    lisinopril 10 mg tablet Take 1 tablet (10 mg) by mouth once daily. 90 tablet 2    metFORMIN (Glucophage) 1,000 mg tablet Take 1 tablet (1,000 mg) by mouth 2 times a day with meals. 180 tablet 2    metoprolol tartrate (Lopressor) 25 mg tablet Take 1 tablet (25 mg) by mouth 2 times a day. 180 tablet 2    multivitamin tablet Take 1 tablet by mouth once daily.      [DISCONTINUED] semaglutide (Rybelsus) 3 mg tablet Take 1 tablet (3 mg) by mouth once daily. Take at least 30 minutes before food and other medications with half glass of water. 30 tablet 0     No current facility-administered medications on file prior to visit.        Assessment/Plan   Problem List Items Addressed This Visit             ICD-10-CM    Diabetes mellitus (Multi) E11.9     Patient's diabetes needs improvement with most recent A1c of 8.3% (Goal < 7%).   Increase: Rybelsus from 3 mg to 7 mg daily  Continue: metformin 1000 mg twice daily, Jardiance 25 mg daily, glimepiride 4 mg daily  Compliance at present is estimated to be good.   Patient reports increased incidence of acid reflux since starting Rybelsus. Will monitor and patient will contact provider if it worsens. Discussed not eating close to bedtime, propping with pillows, and decreasing spice if able.  Follow-up: 5/15/24  PCP Follow-Up: TBD         Relevant Medications    semaglutide (Rybelsus) 7 mg tablet    Other Relevant Orders    Follow Up In Advanced Primary Care - Pharmacy       Aviva MoralesD    Continue all meds under the continuation of care with the referring provider and clinical pharmacy team.    Verbal consent to manage patient's drug therapy was obtained from the patient. They were informed they may decline to participate or withdraw from participation in pharmacy services at any time.

## 2024-05-15 ENCOUNTER — TELEMEDICINE (OUTPATIENT)
Dept: PHARMACY | Facility: HOSPITAL | Age: 69
End: 2024-05-15
Payer: COMMERCIAL

## 2024-05-15 DIAGNOSIS — E11.69 TYPE 2 DIABETES MELLITUS WITH OTHER SPECIFIED COMPLICATION, WITHOUT LONG-TERM CURRENT USE OF INSULIN (MULTI): ICD-10-CM

## 2024-05-15 NOTE — ASSESSMENT & PLAN NOTE
Patient's diabetes needs improvement with most recent A1c of 8.3% (Goal < 7%). Home blood sugar readings are showing improvement. Will want to see FBG in the  range and A1C decrease before decreasing or stopping glimepiride. No significant weight loss on Rybelsus.  Continue: Rybelsus 7 mg daily  Continue: metformin 1000 mg twice daily, Jardiance 25 mg daily, glimepiride 4 mg daily  Compliance at present is estimated to be good.   Patient reports acid reflux occurs occasionally, but coincides with when and what he's eating.   Patient to complete A1C in one month before next visit.  Follow-up: 6/19/24  PCP Follow-Up: YOLIE

## 2024-05-15 NOTE — PROGRESS NOTES
Subjective   Patient ID: Daniele Livingston is a 68 y.o. male who presents for Diabetes.    Referring Provider: Valentino Davila MD     Diabetes  He presents for his follow-up diabetic visit. He has type 2 diabetes mellitus. He is compliant with treatment all of the time. He participates in exercise every other day. His home blood glucose trend is decreasing steadily. An ACE inhibitor/angiotensin II receptor blocker is being taken.     FB mg/dL today, usually 130-150 mg/dL    Patient has evening snacks less frequently.    Denies history of pancreatitis and MTC.    Patient previously reported household income above  PAP limits and does not qualify for cost assistance with Jardiance.    Review of Systems    Medication System Management:  Affordability/Accessibility: Jardiance before meeting deductible.   Adherence/Organization: No issues  Adverse Effects: Weight loss with metformin    Objective     There were no vitals taken for this visit.     Labs  Lab Results   Component Value Date    BILITOT 0.5 11/10/2023    CALCIUM 9.6 11/10/2023    CO2 28 11/10/2023    CL 99 11/10/2023    CREATININE 0.89 11/10/2023    GLUCOSE 176 (H) 11/10/2023    ALKPHOS 92 11/10/2023    K 5.0 11/10/2023    PROT 6.8 11/10/2023     11/10/2023    AST 11 11/10/2023    ALT 24 11/10/2023    BUN 16 11/10/2023    ANIONGAP 15 11/10/2023    MG 2.30 2018    PHOS 2.1 (L) 2018    ALBUMIN 4.3 11/10/2023    GFRMALE 88 2023     Lab Results   Component Value Date    TRIG 121 11/10/2023    CHOL 145 11/10/2023    LDLCALC 80 11/10/2023    HDL 40.4 11/10/2023     Lab Results   Component Value Date    HGBA1C 8.3 (H) 2024       Current Outpatient Medications on File Prior to Visit   Medication Sig Dispense Refill    aspirin 81 mg EC tablet Take 1 tablet (81 mg) by mouth once daily.      atorvastatin (Lipitor) 40 mg tablet Take 1 tablet (40 mg) by mouth once daily. 90 tablet 2    empagliflozin (Jardiance) 25 mg Take 1 tablet (25  mg) by mouth once daily. 90 tablet 2    glimepiride (AmaryL) 4 mg tablet Take 1 tablet (4 mg) by mouth once daily in the morning. Take before meals. 90 tablet 3    lisinopril 10 mg tablet Take 1 tablet (10 mg) by mouth once daily. 90 tablet 2    metFORMIN (Glucophage) 1,000 mg tablet Take 1 tablet (1,000 mg) by mouth 2 times a day with meals. 180 tablet 2    metoprolol tartrate (Lopressor) 25 mg tablet Take 1 tablet (25 mg) by mouth 2 times a day. 180 tablet 2    multivitamin tablet Take 1 tablet by mouth once daily.      [DISCONTINUED] semaglutide (Rybelsus) 7 mg tablet Take 1 tablet (7 mg) by mouth once daily. 30 tablet 0     No current facility-administered medications on file prior to visit.        Assessment/Plan   Problem List Items Addressed This Visit             ICD-10-CM    Diabetes mellitus (Multi) E11.9     Patient's diabetes needs improvement with most recent A1c of 8.3% (Goal < 7%). Home blood sugar readings are showing improvement. Will want to see FBG in the  range and A1C decrease before decreasing or stopping glimepiride. No significant weight loss on Rybelsus.  Continue: Rybelsus 7 mg daily  Continue: metformin 1000 mg twice daily, Jardiance 25 mg daily, glimepiride 4 mg daily  Compliance at present is estimated to be good.   Patient reports acid reflux occurs occasionally, but coincides with when and what he's eating.   Patient to complete A1C in one month before next visit.  Follow-up: 6/19/24  PCP Follow-Up: TBD         Relevant Medications    semaglutide (Rybelsus) 7 mg tablet    Other Relevant Orders    Hemoglobin A1c    Basic metabolic panel    Follow Up In Advanced Primary Care - Pharmacy       Vira Vicente, PharmD    Continue all meds under the continuation of care with the referring provider and clinical pharmacy team.    Verbal consent to manage patient's drug therapy was obtained from the patient. They were informed they may decline to participate or withdraw from participation  in pharmacy services at any time.

## 2024-06-11 ENCOUNTER — LAB (OUTPATIENT)
Dept: LAB | Facility: LAB | Age: 69
End: 2024-06-11
Payer: COMMERCIAL

## 2024-06-11 DIAGNOSIS — E11.69 TYPE 2 DIABETES MELLITUS WITH OTHER SPECIFIED COMPLICATION, WITHOUT LONG-TERM CURRENT USE OF INSULIN (MULTI): ICD-10-CM

## 2024-06-11 LAB
ANION GAP SERPL CALC-SCNC: 11 MMOL/L (ref 10–20)
BUN SERPL-MCNC: 16 MG/DL (ref 6–23)
CALCIUM SERPL-MCNC: 9.4 MG/DL (ref 8.6–10.3)
CHLORIDE SERPL-SCNC: 101 MMOL/L (ref 98–107)
CO2 SERPL-SCNC: 27 MMOL/L (ref 21–32)
CREAT SERPL-MCNC: 0.84 MG/DL (ref 0.5–1.3)
EGFRCR SERPLBLD CKD-EPI 2021: >90 ML/MIN/1.73M*2
EST. AVERAGE GLUCOSE BLD GHB EST-MCNC: 151 MG/DL
GLUCOSE SERPL-MCNC: 153 MG/DL (ref 74–99)
HBA1C MFR BLD: 6.9 %
POTASSIUM SERPL-SCNC: 4.6 MMOL/L (ref 3.5–5.3)
SODIUM SERPL-SCNC: 134 MMOL/L (ref 136–145)

## 2024-06-11 PROCEDURE — 80048 BASIC METABOLIC PNL TOTAL CA: CPT

## 2024-06-11 PROCEDURE — 83036 HEMOGLOBIN GLYCOSYLATED A1C: CPT

## 2024-06-19 ENCOUNTER — APPOINTMENT (OUTPATIENT)
Dept: PHARMACY | Facility: HOSPITAL | Age: 69
End: 2024-06-19
Payer: COMMERCIAL

## 2024-06-19 DIAGNOSIS — E11.69 TYPE 2 DIABETES MELLITUS WITH OTHER SPECIFIED COMPLICATION, WITHOUT LONG-TERM CURRENT USE OF INSULIN (MULTI): ICD-10-CM

## 2024-06-19 NOTE — PROGRESS NOTES
Subjective   Patient ID: Daniele Livingston is a 68 y.o. male who presents for Diabetes.    Referring Provider: Valentino Davila MD   Next visit: TBD  Diabetes  He presents for his follow-up diabetic visit. He has type 2 diabetes mellitus. He is compliant with treatment all of the time. He participates in exercise every other day. His home blood glucose trend is decreasing steadily. An ACE inhibitor/angiotensin II receptor blocker is being taken.     FB mg/dL today, usually 130-170 mg/dL    Denies history of pancreatitis and MTC.    Patient previously reported household income above  PAP limits and does not qualify for cost assistance with [employer].    Diabetes regimen:  Rybelsus 7 mg daily  Jardiance 25 mg daily  Metformin 1000 mg twice daily  Glimepiride 4 mg every morning    Review of Systems    Medication System Management:  Affordability/Accessibility: Jardiance before meeting deductible.   Adherence/Organization: No issues  Adverse Effects: Weight loss with metformin    Objective     There were no vitals taken for this visit.     Labs  Lab Results   Component Value Date    BILITOT 0.5 11/10/2023    CALCIUM 9.4 2024    CO2 27 2024     2024    CREATININE 0.84 2024    GLUCOSE 153 (H) 2024    ALKPHOS 92 11/10/2023    K 4.6 2024    PROT 6.8 11/10/2023     (L) 2024    AST 11 11/10/2023    ALT 24 11/10/2023    BUN 16 2024    ANIONGAP 11 2024    MG 2.30 2018    PHOS 2.1 (L) 2018    ALBUMIN 4.3 11/10/2023    GFRMALE 88 2023     Lab Results   Component Value Date    TRIG 121 11/10/2023    CHOL 145 11/10/2023    LDLCALC 80 11/10/2023    HDL 40.4 11/10/2023     Lab Results   Component Value Date    HGBA1C 6.9 (H) 2024       Current Outpatient Medications on File Prior to Visit   Medication Sig Dispense Refill    aspirin 81 mg EC tablet Take 1 tablet (81 mg) by mouth once daily.      atorvastatin (Lipitor) 40 mg tablet Take 1  tablet (40 mg) by mouth once daily. 90 tablet 2    empagliflozin (Jardiance) 25 mg Take 1 tablet (25 mg) by mouth once daily. 90 tablet 2    glimepiride (AmaryL) 4 mg tablet Take 1 tablet (4 mg) by mouth once daily in the morning. Take before meals. 90 tablet 3    lisinopril 10 mg tablet Take 1 tablet (10 mg) by mouth once daily. 90 tablet 2    metFORMIN (Glucophage) 1,000 mg tablet Take 1 tablet (1,000 mg) by mouth 2 times a day with meals. 180 tablet 2    metoprolol tartrate (Lopressor) 25 mg tablet Take 1 tablet (25 mg) by mouth 2 times a day. 180 tablet 2    multivitamin tablet Take 1 tablet by mouth once daily.      [DISCONTINUED] semaglutide (Rybelsus) 7 mg tablet Take 1 tablet (7 mg) by mouth once daily. 30 tablet 1     No current facility-administered medications on file prior to visit.        Assessment/Plan   Problem List Items Addressed This Visit             ICD-10-CM    Diabetes mellitus (Multi) E11.9     Patient's diabetes is well controlled and improved with most recent A1c of 6.9% (Goal < 7%). Still having occasional reflux, but only when he eats spicy food and a little late in the evening. No significant weight loss on Rybelsus.  Increase: Rybelsus from 7 mg to 14 mg daily  Decrease: glimepiride 4 mg to 2 mg daily - patient will cut tablets  Continue: metformin 1000 mg twice daily, Jardiance 25 mg daily         Relevant Medications    semaglutide (Rybelsus) 14 mg tablet tablet    Other Relevant Orders    Follow Up In [facility] - Pharmacy     Follow-up: 7/17/24    Aviva MoralesD    Continue all meds under the continuation of care with the referring provider and clinical pharmacy team.    Verbal consent to manage patient's drug therapy was obtained from the patient. They were informed they may decline to participate or withdraw from participation in pharmacy services at any time.

## 2024-06-20 NOTE — ASSESSMENT & PLAN NOTE
Patient's diabetes is well controlled and improved with most recent A1c of 6.9% (Goal < 7%). Still having occasional reflux, but only when he eats spicy food and a little late in the evening. No significant weight loss on Rybelsus.  Increase: Rybelsus from 7 mg to 14 mg daily  Decrease: glimepiride 4 mg to 2 mg daily - patient will cut tablets  Continue: metformin 1000 mg twice daily, Jardiance 25 mg daily

## 2024-07-17 ENCOUNTER — APPOINTMENT (OUTPATIENT)
Dept: PHARMACY | Facility: HOSPITAL | Age: 69
End: 2024-07-17
Payer: COMMERCIAL

## 2024-07-17 DIAGNOSIS — E11.69 TYPE 2 DIABETES MELLITUS WITH OTHER SPECIFIED COMPLICATION, WITHOUT LONG-TERM CURRENT USE OF INSULIN (MULTI): ICD-10-CM

## 2024-07-17 NOTE — PROGRESS NOTES
Subjective   Patient ID: Daniele Livingston is a 68 y.o. male who presents for Diabetes.    Referring Provider: Valentino Davila MD   Next visit: TBD    Diabetes  He presents for his follow-up diabetic visit. He has type 2 diabetes mellitus. He is compliant with treatment all of the time. He participates in exercise every other day. His home blood glucose trend is decreasing steadily. An ACE inhibitor/angiotensin II receptor blocker is being taken.     FB-150    Denies history of pancreatitis and MTC.    Patient previously reported household income above  PAP limits and does not qualify for cost assistance with [employer].    Diabetes regimen:  Rybelsus 14 mg daily  Jardiance 25 mg daily  Metformin 1000 mg twice daily  Glimepiride 2 mg every morning    Review of Systems    Medication System Management:  Affordability/Accessibility: Jardiance before meeting deductible.   Adherence/Organization: No issues  Adverse Effects: Weight loss with metformin    Objective     There were no vitals taken for this visit.     Labs  Lab Results   Component Value Date    BILITOT 0.5 11/10/2023    CALCIUM 9.4 2024    CO2 27 2024     2024    CREATININE 0.84 2024    GLUCOSE 153 (H) 2024    ALKPHOS 92 11/10/2023    K 4.6 2024    PROT 6.8 11/10/2023     (L) 2024    AST 11 11/10/2023    ALT 24 11/10/2023    BUN 16 2024    ANIONGAP 11 2024    MG 2.30 2018    PHOS 2.1 (L) 2018    ALBUMIN 4.3 11/10/2023    GFRMALE 88 2023     Lab Results   Component Value Date    TRIG 121 11/10/2023    CHOL 145 11/10/2023    LDLCALC 80 11/10/2023    HDL 40.4 11/10/2023     Lab Results   Component Value Date    HGBA1C 6.9 (H) 2024       Current Outpatient Medications on File Prior to Visit   Medication Sig Dispense Refill    aspirin 81 mg EC tablet Take 1 tablet (81 mg) by mouth once daily.      atorvastatin (Lipitor) 40 mg tablet Take 1 tablet (40 mg) by mouth once  daily. 90 tablet 2    empagliflozin (Jardiance) 25 mg Take 1 tablet (25 mg) by mouth once daily. 90 tablet 2    glimepiride (AmaryL) 4 mg tablet Take 1 tablet (4 mg) by mouth once daily in the morning. Take before meals. 90 tablet 3    lisinopril 10 mg tablet Take 1 tablet (10 mg) by mouth once daily. 90 tablet 2    metFORMIN (Glucophage) 1,000 mg tablet Take 1 tablet (1,000 mg) by mouth 2 times a day with meals. 180 tablet 2    metoprolol tartrate (Lopressor) 25 mg tablet Take 1 tablet (25 mg) by mouth 2 times a day. 180 tablet 2    multivitamin tablet Take 1 tablet by mouth once daily.      semaglutide (Rybelsus) 14 mg tablet tablet Take 1 tablet (14 mg) by mouth once daily. 30 tablet 2     No current facility-administered medications on file prior to visit.        Assessment/Plan   Problem List Items Addressed This Visit             ICD-10-CM    Diabetes mellitus (Multi) E11.9     Patient's diabetes is well controlled and improved with most recent A1c of 6.9% (Goal < 7%). Still having occasional reflux, but only when he eats spicy food and a little late in the evening. He has been eating lighter dinners for the past few weeks and lost a few pounds. Discussed healthy protein-rich snacks that may help patient maintain and put on healthy weight.  Discontinue: glimepiride 2 mg daily  Continue: metformin 1000 mg twice daily, Jardiance 25 mg daily, Rybelsus 14 mg daily         Relevant Orders    Follow Up In [facility] - Pharmacy     Follow-up: 8/7/24    Aviva MoralesD    Continue all meds under the continuation of care with the referring provider and clinical pharmacy team.    Verbal consent to manage patient's drug therapy was obtained from the patient. They were informed they may decline to participate or withdraw from participation in pharmacy services at any time.

## 2024-07-17 NOTE — ASSESSMENT & PLAN NOTE
Patient's diabetes is well controlled and improved with most recent A1c of 6.9% (Goal < 7%). Still having occasional reflux, but only when he eats spicy food and a little late in the evening. He has been eating lighter dinners for the past few weeks and lost a few pounds. Discussed healthy protein-rich snacks that may help patient maintain and put on healthy weight.  Discontinue: glimepiride 2 mg daily  Continue: metformin 1000 mg twice daily, Jardiance 25 mg daily, Rybelsus 14 mg daily

## 2024-08-07 ENCOUNTER — APPOINTMENT (OUTPATIENT)
Dept: PHARMACY | Facility: HOSPITAL | Age: 69
End: 2024-08-07
Payer: COMMERCIAL

## 2024-08-07 DIAGNOSIS — E11.69 TYPE 2 DIABETES MELLITUS WITH OTHER SPECIFIED COMPLICATION, WITHOUT LONG-TERM CURRENT USE OF INSULIN (MULTI): ICD-10-CM

## 2024-08-07 NOTE — ASSESSMENT & PLAN NOTE
Patient's diabetes is well controlled and improved with most recent A1c of 6.9% (Goal < 7%). Still having occasional reflux, but only when he eats spicy food and a little late in the evening. Patient will focus on eating a sufficient amount of calories and adequate amount of protein to try to gain healthy weight.  Continue: metformin 1000 mg twice daily, Jardiance 25 mg daily, Rybelsus 14 mg daily.  Future consideration: decreasing metformin if blood sugars can be maintained.

## 2024-08-07 NOTE — PROGRESS NOTES
Subjective   Patient ID: Daniele Livingston is a 68 y.o. male who presents for Diabetes.    Referring Provider: Valentino Davila MD   Next visit: 24    Diabetes  He presents for his follow-up diabetic visit. He has type 2 diabetes mellitus. He is compliant with treatment all of the time. He participates in exercise every other day. His home blood glucose trend is decreasing steadily. An ACE inhibitor/angiotensin II receptor blocker is being taken.     FB-150, today 126. No changes with discontinuation of glimepiride.    Denies history of pancreatitis and MTC.    Patient previously reported household income above  PAP limits and does not qualify for cost assistance with [employer].    Diabetes regimen:  Rybelsus 14 mg daily  Jardiance 25 mg daily  Metformin 1000 mg twice daily    Review of Systems    Medication System Management:  Affordability/Accessibility: Jardiance before meeting deductible.   Adherence/Organization: No issues  Adverse Effects: Weight loss with metformin    Objective     There were no vitals taken for this visit.     Labs  Lab Results   Component Value Date    BILITOT 0.5 11/10/2023    CALCIUM 9.4 2024    CO2 27 2024     2024    CREATININE 0.84 2024    GLUCOSE 153 (H) 2024    ALKPHOS 92 11/10/2023    K 4.6 2024    PROT 6.8 11/10/2023     (L) 2024    AST 11 11/10/2023    ALT 24 11/10/2023    BUN 16 2024    ANIONGAP 11 2024    MG 2.30 2018    PHOS 2.1 (L) 2018    ALBUMIN 4.3 11/10/2023    GFRMALE 88 2023     Lab Results   Component Value Date    TRIG 121 11/10/2023    CHOL 145 11/10/2023    LDLCALC 80 11/10/2023    HDL 40.4 11/10/2023     Lab Results   Component Value Date    HGBA1C 6.9 (H) 2024       Current Outpatient Medications on File Prior to Visit   Medication Sig Dispense Refill    aspirin 81 mg EC tablet Take 1 tablet (81 mg) by mouth once daily.      atorvastatin (Lipitor) 40 mg tablet Take  1 tablet (40 mg) by mouth once daily. 90 tablet 2    empagliflozin (Jardiance) 25 mg Take 1 tablet (25 mg) by mouth once daily. 90 tablet 2    lisinopril 10 mg tablet Take 1 tablet (10 mg) by mouth once daily. 90 tablet 2    metFORMIN (Glucophage) 1,000 mg tablet Take 1 tablet (1,000 mg) by mouth 2 times a day with meals. 180 tablet 2    metoprolol tartrate (Lopressor) 25 mg tablet Take 1 tablet (25 mg) by mouth 2 times a day. 180 tablet 2    multivitamin tablet Take 1 tablet by mouth once daily.      semaglutide (Rybelsus) 14 mg tablet tablet Take 1 tablet (14 mg) by mouth once daily. 30 tablet 2    [DISCONTINUED] glimepiride (AmaryL) 4 mg tablet Take 1 tablet (4 mg) by mouth once daily in the morning. Take before meals. 90 tablet 3     No current facility-administered medications on file prior to visit.        Assessment/Plan   Problem List Items Addressed This Visit             ICD-10-CM    Diabetes mellitus (Multi) E11.9     Patient's diabetes is well controlled and improved with most recent A1c of 6.9% (Goal < 7%). Still having occasional reflux, but only when he eats spicy food and a little late in the evening. Patient will focus on eating a sufficient amount of calories and adequate amount of protein to try to gain healthy weight.  Continue: metformin 1000 mg twice daily, Jardiance 25 mg daily, Rybelsus 14 mg daily.  Future consideration: decreasing metformin if blood sugars can be maintained.         Relevant Orders    Follow Up In [facility] - Pharmacy     Follow-up: 9/18/24    Aviva MoralesD    Continue all meds under the continuation of care with the referring provider and clinical pharmacy team.    Verbal consent to manage patient's drug therapy was obtained from the patient. They were informed they may decline to participate or withdraw from participation in pharmacy services at any time.

## 2024-08-21 ENCOUNTER — APPOINTMENT (OUTPATIENT)
Dept: PRIMARY CARE | Facility: CLINIC | Age: 69
End: 2024-08-21
Payer: COMMERCIAL

## 2024-08-21 VITALS
DIASTOLIC BLOOD PRESSURE: 72 MMHG | BODY MASS INDEX: 20.76 KG/M2 | WEIGHT: 124.6 LBS | HEIGHT: 65 IN | HEART RATE: 65 BPM | SYSTOLIC BLOOD PRESSURE: 123 MMHG | OXYGEN SATURATION: 98 %

## 2024-08-21 DIAGNOSIS — Z00.00 MEDICARE ANNUAL WELLNESS VISIT, SUBSEQUENT: Primary | Chronic | ICD-10-CM

## 2024-08-21 DIAGNOSIS — Z00.00 ROUTINE GENERAL MEDICAL EXAMINATION AT HEALTH CARE FACILITY: ICD-10-CM

## 2024-08-21 DIAGNOSIS — I25.10 CAD, MULTIPLE VESSEL: ICD-10-CM

## 2024-08-21 DIAGNOSIS — E11.49 OTHER DIABETIC NEUROLOGICAL COMPLICATION ASSOCIATED WITH TYPE 2 DIABETES MELLITUS (MULTI): ICD-10-CM

## 2024-08-21 DIAGNOSIS — I10 BENIGN ESSENTIAL HTN: ICD-10-CM

## 2024-08-21 DIAGNOSIS — Z95.1 HISTORY OF CORONARY ARTERY BYPASS SURGERY: ICD-10-CM

## 2024-08-21 DIAGNOSIS — E78.5 HYPERLIPIDEMIA, UNSPECIFIED HYPERLIPIDEMIA TYPE: ICD-10-CM

## 2024-08-21 PROCEDURE — 1036F TOBACCO NON-USER: CPT | Performed by: INTERNAL MEDICINE

## 2024-08-21 PROCEDURE — 1159F MED LIST DOCD IN RCRD: CPT | Performed by: INTERNAL MEDICINE

## 2024-08-21 PROCEDURE — 1160F RVW MEDS BY RX/DR IN RCRD: CPT | Performed by: INTERNAL MEDICINE

## 2024-08-21 PROCEDURE — 1123F ACP DISCUSS/DSCN MKR DOCD: CPT | Performed by: INTERNAL MEDICINE

## 2024-08-21 PROCEDURE — 4010F ACE/ARB THERAPY RXD/TAKEN: CPT | Performed by: INTERNAL MEDICINE

## 2024-08-21 PROCEDURE — G0439 PPPS, SUBSEQ VISIT: HCPCS | Performed by: INTERNAL MEDICINE

## 2024-08-21 PROCEDURE — 3074F SYST BP LT 130 MM HG: CPT | Performed by: INTERNAL MEDICINE

## 2024-08-21 PROCEDURE — 1170F FXNL STATUS ASSESSED: CPT | Performed by: INTERNAL MEDICINE

## 2024-08-21 PROCEDURE — 3008F BODY MASS INDEX DOCD: CPT | Performed by: INTERNAL MEDICINE

## 2024-08-21 PROCEDURE — 3044F HG A1C LEVEL LT 7.0%: CPT | Performed by: INTERNAL MEDICINE

## 2024-08-21 PROCEDURE — 3078F DIAST BP <80 MM HG: CPT | Performed by: INTERNAL MEDICINE

## 2024-08-21 PROCEDURE — 1158F ADVNC CARE PLAN TLK DOCD: CPT | Performed by: INTERNAL MEDICINE

## 2024-08-21 RX ORDER — ATORVASTATIN CALCIUM 40 MG/1
40 TABLET, FILM COATED ORAL DAILY
Qty: 90 TABLET | Refills: 2 | Status: SHIPPED | OUTPATIENT
Start: 2024-08-21

## 2024-08-21 RX ORDER — LISINOPRIL 10 MG/1
10 TABLET ORAL DAILY
Qty: 90 TABLET | Refills: 2 | Status: SHIPPED | OUTPATIENT
Start: 2024-08-21

## 2024-08-21 ASSESSMENT — ACTIVITIES OF DAILY LIVING (ADL)
MANAGING_FINANCES: INDEPENDENT
TAKING_MEDICATION: INDEPENDENT
DRESSING: INDEPENDENT
GROCERY_SHOPPING: INDEPENDENT
BATHING: INDEPENDENT
BATHING: INDEPENDENT
DOING_HOUSEWORK: INDEPENDENT
DRESSING: INDEPENDENT

## 2024-08-21 ASSESSMENT — PATIENT HEALTH QUESTIONNAIRE - PHQ9
SUM OF ALL RESPONSES TO PHQ9 QUESTIONS 1 AND 2: 0
1. LITTLE INTEREST OR PLEASURE IN DOING THINGS: NOT AT ALL
2. FEELING DOWN, DEPRESSED OR HOPELESS: NOT AT ALL

## 2024-08-21 ASSESSMENT — ENCOUNTER SYMPTOMS
DEPRESSION: 0
CONSTITUTIONAL NEGATIVE: 1
OCCASIONAL FEELINGS OF UNSTEADINESS: 0
LOSS OF SENSATION IN FEET: 0

## 2024-08-21 NOTE — PROGRESS NOTES
"Patient ID: Daniele Livingston is a 68 y.o. male who presents for Medicare Annual Wellness Visit Subsequent.    /72   Pulse 65   Ht 1.651 m (5' 5\")   Wt 56.5 kg (124 lb 9.6 oz)   SpO2 98%   BMI 20.73 kg/m²     HPI      CAD STABLE       S/P CABG X4       DIABETES 2 CONTROLLED   WITH NEUROPATHY     HE LOST SIGNIFICANT WEIGHT   ON RYBELSUS 14MG AND METFORMIN 2GM   A DAY , HE IS HAVING PROBLEM AFFORDING RYBELSUS BECAUSE OF COST     Subjective     Review of Systems   Constitutional: Negative.    All other systems reviewed and are negative.      Objective     Physical Exam  Vitals reviewed.   Neck:      Vascular: No carotid bruit.   Cardiovascular:      Rate and Rhythm: Normal rate and regular rhythm.      Pulses: Normal pulses.      Heart sounds: Normal heart sounds. No murmur heard.  Pulmonary:      Effort: Pulmonary effort is normal.      Breath sounds: Normal breath sounds.   Abdominal:      General: Abdomen is flat.      Palpations: Abdomen is soft. There is no mass.      Tenderness: There is no abdominal tenderness. There is no guarding or rebound.      Hernia: No hernia is present.   Musculoskeletal:         General: Deformity: P45.      Right lower leg: No edema.      Left lower leg: No edema.   Skin:     Capillary Refill: Capillary refill takes more than 3 seconds.   Neurological:      General: No focal deficit present.      Mental Status: He is oriented to person, place, and time. Mental status is at baseline.   Psychiatric:         Mood and Affect: Mood normal.         Behavior: Behavior normal.         Thought Content: Thought content normal.         Judgment: Judgment normal.         Lab Results   Component Value Date    WBC 7.7 11/10/2023    HGB 15.3 11/10/2023    HCT 47.7 11/10/2023    MCV 92 11/10/2023     11/10/2023           Problem List Items Addressed This Visit       Benign essential HTN    Relevant Medications    lisinopril 10 mg tablet    CAD, multiple vessel    Relevant Medications    " atorvastatin (Lipitor) 40 mg tablet    lisinopril 10 mg tablet    History of coronary artery bypass surgery    Hyperlipidemia    Relevant Medications    atorvastatin (Lipitor) 40 mg tablet    Diabetic neuropathy associated with type 2 diabetes mellitus (Multi)     STABLE         Relevant Medications    lisinopril 10 mg tablet    Other Relevant Orders    Hemoglobin A1c    Albumin-Creatinine Ratio, Urine Random     Other Visit Diagnoses       Medicare annual wellness visit, subsequent  (Chronic)   -  Primary    Routine general medical examination at health care facility                   A/P         A1C ON 12/18/2024  URINE MICROALBUMIN ON 12/18/2024  ATORVASTATIN 40MG 1 PILL EVERY DAY FILLED  LISINOPRIL 10MG 1 PILL EVERY DAY FILLED   I TOLD HIM TO DISCUSS WITH BENIGNO ANGELES   PERTAINING TO RYMARYSUS SINCE HE IS LOSING SIGNIFICANT WEIGHT AND CANNOT AFFORD TO PAY TOO MUCH FOR THE MEDICATION       Advance Care Planning Note     Discussion Date: 08/21/24   Discussion Participants: patient    The patient wishes to discuss Advance Care Planning today and the following is a brief summary of our discussion.     Patient has capacity to make their own medical decisions: Yes  Health Care Agent/Surrogate Decision Maker documented in chart: Yes    Documents on file and valid:  Advance Directive/Living Will: No   Health Care Power of : No  Other:     Communication of Medical Status/Prognosis:        Communication of Treatment Goals/Options:     Discussed with the patient end-of-life choices he is presently full code he does not have a living will or healthcare power of  he will think about getting it done at the next office visit so that it can be scanned into the chart for the    Treatment Decisions  Goals of Care: survival is prioritized, if goals for quality or survival can reasonably be achieved       Follow Up Plan      Team Members    Time Statement: Total face to face time spent on advance care planning was 5  minutes with 5 minutes spent in counseling, including the explanation.    Valentino Davila MD  8/21/2024 8:21 AM

## 2024-09-12 DIAGNOSIS — E11.49 OTHER DIABETIC NEUROLOGICAL COMPLICATION ASSOCIATED WITH TYPE 2 DIABETES MELLITUS: ICD-10-CM

## 2024-09-18 ENCOUNTER — APPOINTMENT (OUTPATIENT)
Dept: PHARMACY | Facility: HOSPITAL | Age: 69
End: 2024-09-18
Payer: COMMERCIAL

## 2024-09-18 DIAGNOSIS — E11.69 TYPE 2 DIABETES MELLITUS WITH OTHER SPECIFIED COMPLICATION, WITHOUT LONG-TERM CURRENT USE OF INSULIN: ICD-10-CM

## 2024-09-18 RX ORDER — GLIMEPIRIDE 4 MG/1
4 TABLET ORAL
Qty: 100 TABLET | Refills: 0 | Status: SHIPPED | OUTPATIENT
Start: 2024-09-18 | End: 2024-12-27

## 2024-09-18 NOTE — ASSESSMENT & PLAN NOTE
Patient's diabetes is well controlled and improved with most recent A1c of 6.9% (Goal < 7%). Still having occasional reflux, but only when he eats spicy food and a little late in the evening. Patient will focus on eating a sufficient amount of calories and adequate amount of protein to try to gain healthy weight. Will stop Rybelsus when he runs out and restart glimepiride.  Continue: metformin 1000 mg twice daily and Jardiance 25 mg daily  Continue Rybelsus 14 mg daily until supply runs out. Will discontinue at that time.  Start glimepiride 4 mg daily again.

## 2024-09-18 NOTE — PROGRESS NOTES
Subjective   Patient ID: Daniele Livingston is a 68 y.o. male who presents for Diabetes.    Referring Provider: Valentino Davila MD   Next visit: 24    Diabetes  He presents for his follow-up diabetic visit. He has type 2 diabetes mellitus. He is compliant with treatment all of the time. He participates in exercise every other day. His home blood glucose trend is decreasing steadily. An ACE inhibitor/angiotensin II receptor blocker is being taken.     FB-170 recently. Has been eating sweets in the evening.  Previous FB-150, today 126.     Denies history of pancreatitis and MTC.    Patient previously reported household income above  PAP limits and does not qualify for cost assistance with Jardiance. Once again not interested in  PAP for privacy.    Diabetes regimen:  Rybelsus 14 mg daily  Jardiance 25 mg daily  Metformin 1000 mg twice daily    Has been out of Jardiance for a few days and hasn't picked up the refill yet.    Review of Systems    Medication System Management:  Affordability/Accessibility: Currently in the coverage gap, affecting Jardiance and Rybelsus costs.  Adherence/Organization: No issues  Adverse Effects: Weight loss with metformin    Objective     There were no vitals taken for this visit.     Labs  Lab Results   Component Value Date    BILITOT 0.5 11/10/2023    CALCIUM 9.4 2024    CO2 27 2024     2024    CREATININE 0.84 2024    GLUCOSE 153 (H) 2024    ALKPHOS 92 11/10/2023    K 4.6 2024    PROT 6.8 11/10/2023     (L) 2024    AST 11 11/10/2023    ALT 24 11/10/2023    BUN 16 2024    ANIONGAP 11 2024    MG 2.30 2018    PHOS 2.1 (L) 2018    ALBUMIN 4.3 11/10/2023    GFRMALE 88 2023     Lab Results   Component Value Date    TRIG 121 11/10/2023    CHOL 145 11/10/2023    LDLCALC 80 11/10/2023    HDL 40.4 11/10/2023     Lab Results   Component Value Date    HGBA1C 6.9 (H) 2024       Current  Outpatient Medications on File Prior to Visit   Medication Sig Dispense Refill    aspirin 81 mg EC tablet Take 1 tablet (81 mg) by mouth once daily.      atorvastatin (Lipitor) 40 mg tablet Take 1 tablet (40 mg) by mouth once daily. 90 tablet 2    empagliflozin (Jardiance) 25 mg Take 1 tablet (25 mg) by mouth once daily. 90 tablet 1    lisinopril 10 mg tablet Take 1 tablet (10 mg) by mouth once daily. 90 tablet 2    metFORMIN (Glucophage) 1,000 mg tablet Take 1 tablet (1,000 mg) by mouth 2 times a day with meals. 180 tablet 2    multivitamin tablet Take 1 tablet by mouth once daily.      semaglutide (Rybelsus) 14 mg tablet tablet Take 1 tablet (14 mg) by mouth once daily. 30 tablet 2    [DISCONTINUED] empagliflozin (Jardiance) 25 mg Take 1 tablet (25 mg) by mouth once daily. 90 tablet 2     No current facility-administered medications on file prior to visit.        Assessment/Plan   Problem List Items Addressed This Visit             ICD-10-CM    Diabetes mellitus (Multi) E11.9     Patient's diabetes is well controlled and improved with most recent A1c of 6.9% (Goal < 7%). Still having occasional reflux, but only when he eats spicy food and a little late in the evening. Patient will focus on eating a sufficient amount of calories and adequate amount of protein to try to gain healthy weight. Will stop Rybelsus when he runs out and restart glimepiride.  Continue: metformin 1000 mg twice daily and Jardiance 25 mg daily  Continue Rybelsus 14 mg daily until supply runs out. Will discontinue at that time.  Start glimepiride 4 mg daily again.         Relevant Medications    glimepiride (AmaryL) 4 mg tablet    Other Relevant Orders    Follow Up In [facility] - Pharmacy     Follow-up: 10/30/24    Aviva MoralesD    Continue all meds under the continuation of care with the referring provider and clinical pharmacy team.    Verbal consent to manage patient's drug therapy was obtained from the patient. They were  informed they may decline to participate or withdraw from participation in pharmacy services at any time.

## 2024-10-30 ENCOUNTER — APPOINTMENT (OUTPATIENT)
Dept: PHARMACY | Facility: HOSPITAL | Age: 69
End: 2024-10-30
Payer: MEDICARE

## 2024-10-30 DIAGNOSIS — E11.69 TYPE 2 DIABETES MELLITUS WITH OTHER SPECIFIED COMPLICATION, WITHOUT LONG-TERM CURRENT USE OF INSULIN: ICD-10-CM

## 2024-11-27 ENCOUNTER — APPOINTMENT (OUTPATIENT)
Dept: PHARMACY | Facility: HOSPITAL | Age: 69
End: 2024-11-27
Payer: COMMERCIAL

## 2024-11-27 DIAGNOSIS — E11.69 TYPE 2 DIABETES MELLITUS WITH OTHER SPECIFIED COMPLICATION, WITHOUT LONG-TERM CURRENT USE OF INSULIN: ICD-10-CM

## 2024-11-27 RX ORDER — METFORMIN HYDROCHLORIDE 1000 MG/1
1000 TABLET ORAL
Qty: 180 TABLET | Refills: 1 | Status: SHIPPED | OUTPATIENT
Start: 2024-11-27

## 2024-11-27 NOTE — ASSESSMENT & PLAN NOTE
Patient's goal A1c is < 7%.  Is pt at goal? Yes, most recent A1C was 6.9%.  Patient's SMBGs are consistent despite resumption of glimepiride. Readings are still slightly above fasting goal of .  Rationale for plan: Patient is due for updated A1C. Medication changes pending lab results.    Medication Changes:  CONTINUE:  Jardiance 25 mg daily  Metformin 1000 mg twice daily  Glimepiride 4 mg daily

## 2024-11-27 NOTE — PROGRESS NOTES
Clinical Pharmacy Appointment    Patient ID: Daniele Livingston is a 68 y.o. male who presents for Diabetes.    Pt is here for Follow Up appointment.     Referring Provider: Valentino Davila MD  PCP: Valentino Davila MD   Last visit with PCP: 8/21/24   Next visit with PCP: To be scheduled    Subjective     Interval History  Restarted glimepiride    HPI  DIABETES MELLITUS TYPE 2:    Does patient follow with Endocrinology: No     Current diabetic medications include:  Jardiance 25 mg daily  Metformin 1000 mg twice daily  Glimepiride 4 mg daily    Clarifications to above regimen: N/A  Adverse Effects: weight loss with metformin    Past diabetic medications include:  Rybelsus (weight loss)    Glucose Readings:  Patient tests BG 1 time per day    Current home BG readings: 150-176  Previous home BG readings: 140-180    Any episodes of hypoglycemia? No.    Did patient treat episode of hypoglycemia appropriately? N/A  Does the patient have a prescription for ready-to-use Glucagon? Not on insulin  Does pt have proteinuria? No    Lifestyle:  Diet: Lean protein, vegetables, rice  Physical Activity: works out at the gym every day for 30 minutes    Secondary Prevention:  Statin? Yes  ACE-I/ARB? Yes  Aspirin? Yes    Pertinent PMH Review:  PMH of Pancreatitis: No  PMH of Retinopathy: No  PMH of Urinary Tract Infections: No  PMH of MTC: No     Drug Interactions  No relevant drug interactions were noted.    Medication System Management  Patient's preferred pharmacy: [facility]  Adherence/Organization: No issues  Affordability/Accessibility: Coverage gap makes brand name medications expensive, but patient does not want to pursue  PAP.    Objective   No Known Allergies  Social History     Social History Narrative    Not on file      Medication Review  Current Outpatient Medications   Medication Instructions    aspirin 81 mg EC tablet 1 tablet, oral, Daily    atorvastatin (LIPITOR) 40 mg, oral, Daily    empagliflozin (JARDIANCE)  25 mg, oral, Daily    glimepiride (AMARYL) 4 mg, oral, Daily before breakfast    lisinopril 10 mg, oral, Daily    metFORMIN (GLUCOPHAGE) 1,000 mg, oral, 2 times daily (morning and late afternoon)    multivitamin tablet 1 tablet, oral, Daily      Vitals  BP Readings from Last 2 Encounters:   08/21/24 123/72   11/13/23 110/50     BMI Readings from Last 1 Encounters:   08/21/24 20.73 kg/m²      Labs  A1C  Lab Results   Component Value Date    HGBA1C 6.9 (H) 06/11/2024    HGBA1C 8.3 (H) 03/13/2024    HGBA1C 8.7 (H) 11/10/2023     BMP  Lab Results   Component Value Date    CALCIUM 9.4 06/11/2024     (L) 06/11/2024    K 4.6 06/11/2024    CO2 27 06/11/2024     06/11/2024    BUN 16 06/11/2024    CREATININE 0.84 06/11/2024    EGFR >90 06/11/2024     LFTs  Lab Results   Component Value Date    ALT 24 11/10/2023    AST 11 11/10/2023    ALKPHOS 92 11/10/2023    BILITOT 0.5 11/10/2023     FLP  Lab Results   Component Value Date    TRIG 121 11/10/2023    CHOL 145 11/10/2023    LDLF 58 09/15/2020    LDLCALC 80 11/10/2023    HDL 40.4 11/10/2023     Urine Microalbumin  Lab Results   Component Value Date    MICROALBCREA  11/10/2023      Comment:      One or more analytes used in this calculation is outside of the analytical measurement range. Calculation cannot be performed.     Weight Management  Wt Readings from Last 3 Encounters:   08/21/24 56.5 kg (124 lb 9.6 oz)   11/13/23 57.2 kg (126 lb 3.2 oz)   03/06/23 57.1 kg (125 lb 12.8 oz)      There is no height or weight on file to calculate BMI.     Assessment/Plan   Problem List Items Addressed This Visit       Diabetes mellitus (Multi)     Patient's goal A1c is < 7%.  Is pt at goal? Yes, most recent A1C was 6.9%.  Patient's SMBGs are consistent despite resumption of glimepiride. Readings are still slightly above fasting goal of .  Rationale for plan: Patient is due for updated A1C. Medication changes pending lab results.    Medication  Changes:  CONTINUE:  Jardiance 25 mg daily  Metformin 1000 mg twice daily  Glimepiride 4 mg daily         Relevant Medications    metFORMIN (Glucophage) 1,000 mg tablet    Other Relevant Orders    Referral to Clinical Pharmacy     Clinical Pharmacist follow-up: 12/18/24, Telehealth visit    Continue all meds under the continuation of care with the referring provider and clinical pharmacy team.    Thank you,  Vira Vicente, PharmD  Clinical Pharmacist  [phone]    Verbal consent to manage patient's drug therapy was obtained from the patient. They were informed they may decline to participate or withdraw from participation in pharmacy services at any time.

## 2024-12-03 ENCOUNTER — LAB (OUTPATIENT)
Dept: LAB | Facility: LAB | Age: 69
End: 2024-12-03
Payer: MEDICARE

## 2024-12-03 DIAGNOSIS — E11.49 OTHER DIABETIC NEUROLOGICAL COMPLICATION ASSOCIATED WITH TYPE 2 DIABETES MELLITUS: ICD-10-CM

## 2024-12-03 LAB
CREAT UR-MCNC: 50.5 MG/DL (ref 20–370)
EST. AVERAGE GLUCOSE BLD GHB EST-MCNC: 171 MG/DL
HBA1C MFR BLD: 7.6 %
MICROALBUMIN UR-MCNC: <7 MG/L
MICROALBUMIN/CREAT UR: NORMAL MG/G{CREAT}

## 2024-12-03 PROCEDURE — 82570 ASSAY OF URINE CREATININE: CPT

## 2024-12-03 PROCEDURE — 36415 COLL VENOUS BLD VENIPUNCTURE: CPT

## 2024-12-03 PROCEDURE — 82043 UR ALBUMIN QUANTITATIVE: CPT

## 2024-12-03 PROCEDURE — 83036 HEMOGLOBIN GLYCOSYLATED A1C: CPT

## 2024-12-18 ENCOUNTER — APPOINTMENT (OUTPATIENT)
Dept: PHARMACY | Facility: HOSPITAL | Age: 69
End: 2024-12-18
Payer: COMMERCIAL

## 2024-12-18 DIAGNOSIS — E11.69 TYPE 2 DIABETES MELLITUS WITH OTHER SPECIFIED COMPLICATION, WITHOUT LONG-TERM CURRENT USE OF INSULIN: ICD-10-CM

## 2024-12-18 NOTE — ASSESSMENT & PLAN NOTE
Patient's goal A1c is < 7%.  Is pt at goal? No, A1C wang to 7.6%  Patient's SMBGs are mixed between within goal and slightly above fasting goal of . He's had a couple readings in the 110s and 120s. He is going to try to eat less carbs at dinner and see if fasting blood sugars improve. Will hold off on increasing glimepiride.    Medication Changes:  CONTINUE:  Jardiance 25 mg daily  Metformin 1000 mg twice daily  Glimepiride 4 mg daily

## 2024-12-18 NOTE — PROGRESS NOTES
Clinical Pharmacy Appointment    Patient ID: Daniele Livingston is a 69 y.o. male who presents for Diabetes.    Pt is here for Follow Up appointment.     Referring Provider: Valentino Davila MD  PCP: Valentino Davila MD   Last visit with PCP: 8/21/24   Next visit with PCP: To be scheduled    Subjective     HPI  DIABETES MELLITUS TYPE 2:    Does patient follow with Endocrinology: No     Current diabetic medications include:  Jardiance 25 mg daily  Metformin 1000 mg twice daily  Glimepiride 4 mg daily    Clarifications to above regimen: N/A  Adverse Effects: weight loss with metformin    Past diabetic medications include:  Rybelsus (weight loss)    Glucose Readings:  Patient tests BG 1 time per day    Current home BG readings: 118-175  Previous home BG readings: 150-176    Any episodes of hypoglycemia? No.    Did patient treat episode of hypoglycemia appropriately? N/A  Does the patient have a prescription for ready-to-use Glucagon? Not on insulin  Does pt have proteinuria? No    Lifestyle:  Diet: Lean protein, vegetables, rice  Physical Activity: works out at the gym every day for 30 minutes    Secondary Prevention:  Statin? Yes  ACE-I/ARB? Yes  Aspirin? Yes    Pertinent PMH Review:  PMH of Pancreatitis: No  PMH of Retinopathy: No  PMH of Urinary Tract Infections: No  PMH of MTC: No     Drug Interactions  No relevant drug interactions were noted.    Medication System Management  Patient's preferred pharmacy: [facility]  Adherence/Organization: No issues  Affordability/Accessibility: Coverage gap makes brand name medications expensive, but patient does not want to pursue  PAP.    Objective   No Known Allergies  Social History     Social History Narrative    Not on file      Medication Review  Current Outpatient Medications   Medication Instructions    aspirin 81 mg EC tablet 1 tablet, oral, Daily    atorvastatin (LIPITOR) 40 mg, oral, Daily    empagliflozin (JARDIANCE) 25 mg, oral, Daily    glimepiride (AMARYL)  4 mg, oral, Daily before breakfast    lisinopril 10 mg, oral, Daily    metFORMIN (GLUCOPHAGE) 1,000 mg, oral, 2 times daily (morning and late afternoon)    multivitamin tablet 1 tablet, oral, Daily      Vitals  BP Readings from Last 2 Encounters:   08/21/24 123/72   11/13/23 110/50     BMI Readings from Last 1 Encounters:   08/21/24 20.73 kg/m²      Labs  A1C  Lab Results   Component Value Date    HGBA1C 7.6 (H) 12/03/2024    HGBA1C 6.9 (H) 06/11/2024    HGBA1C 8.3 (H) 03/13/2024     BMP  Lab Results   Component Value Date    CALCIUM 9.4 06/11/2024     (L) 06/11/2024    K 4.6 06/11/2024    CO2 27 06/11/2024     06/11/2024    BUN 16 06/11/2024    CREATININE 0.84 06/11/2024    EGFR >90 06/11/2024     LFTs  Lab Results   Component Value Date    ALT 24 11/10/2023    AST 11 11/10/2023    ALKPHOS 92 11/10/2023    BILITOT 0.5 11/10/2023     FLP  Lab Results   Component Value Date    TRIG 121 11/10/2023    CHOL 145 11/10/2023    LDLF 58 09/15/2020    LDLCALC 80 11/10/2023    HDL 40.4 11/10/2023     Urine Microalbumin  Lab Results   Component Value Date    MICROALBCREA  12/03/2024      Comment:      One or more analytes used in this calculation is outside of the analytical measurement range. Calculation cannot be performed.     Weight Management  Wt Readings from Last 3 Encounters:   08/21/24 56.5 kg (124 lb 9.6 oz)   11/13/23 57.2 kg (126 lb 3.2 oz)   03/06/23 57.1 kg (125 lb 12.8 oz)      There is no height or weight on file to calculate BMI.     Assessment/Plan   Problem List Items Addressed This Visit       Diabetes mellitus (Multi)     Patient's goal A1c is < 7%.  Is pt at goal? No, A1C wang to 7.6%  Patient's SMBGs are mixed between within goal and slightly above fasting goal of . He's had a couple readings in the 110s and 120s. He is going to try to eat less carbs at dinner and see if fasting blood sugars improve. Will hold off on increasing glimepiride.    Medication Changes:  CONTINUE:  Jardiance  25 mg daily  Metformin 1000 mg twice daily  Glimepiride 4 mg daily         Relevant Orders    Referral to Clinical Pharmacy     Clinical Pharmacist follow-up: 1/22/25, Telehealth visit    Continue all meds under the continuation of care with the referring provider and clinical pharmacy team.    Thank you,  Vira Vicente, PharmD  Clinical Pharmacist  [phone]    Verbal consent to manage patient's drug therapy was obtained from the patient. They were informed they may decline to participate or withdraw from participation in pharmacy services at any time.

## 2025-01-12 NOTE — ASSESSMENT & PLAN NOTE
Patient's diabetes needs improvement with most recent A1c of 8.3% (Goal < 7%).   Increase: Rybelsus from 3 mg to 7 mg daily  Continue: metformin 1000 mg twice daily, Jardiance 25 mg daily, glimepiride 4 mg daily  Compliance at present is estimated to be good.   Patient reports increased incidence of acid reflux since starting Rybelsus. Will monitor and patient will contact provider if it worsens. Discussed not eating close to bedtime, propping with pillows, and decreasing spice if able.  Follow-up: 5/15/24  PCP Follow-Up: YOLIE   Verbalized Understanding

## 2025-01-21 DIAGNOSIS — E11.69 TYPE 2 DIABETES MELLITUS WITH OTHER SPECIFIED COMPLICATION, WITHOUT LONG-TERM CURRENT USE OF INSULIN: ICD-10-CM

## 2025-01-22 ENCOUNTER — APPOINTMENT (OUTPATIENT)
Dept: PHARMACY | Facility: HOSPITAL | Age: 70
End: 2025-01-22
Payer: COMMERCIAL

## 2025-01-22 DIAGNOSIS — E11.69 TYPE 2 DIABETES MELLITUS WITH OTHER SPECIFIED COMPLICATION, WITHOUT LONG-TERM CURRENT USE OF INSULIN: ICD-10-CM

## 2025-01-22 RX ORDER — GLIMEPIRIDE 4 MG/1
4 TABLET ORAL
Qty: 180 TABLET | Refills: 1 | Status: SHIPPED | OUTPATIENT
Start: 2025-01-22 | End: 2025-07-21

## 2025-01-22 RX ORDER — GLIMEPIRIDE 4 MG/1
TABLET ORAL
Qty: 100 TABLET | Refills: 0 | OUTPATIENT
Start: 2025-01-22

## 2025-01-22 NOTE — PROGRESS NOTES
Clinical Pharmacy Appointment    Patient ID: Daniele Livingston is a 69 y.o. male who presents for Diabetes.    Pt is here for Follow Up appointment.     Referring Provider: Valentino Davila MD  PCP: Valentino Davila MD   Last visit with PCP: 8/21/24   Next visit with PCP: To be scheduled    Subjective     HPI  DIABETES MELLITUS TYPE 2:    Does patient follow with Endocrinology: No     Current diabetic medications include:  Metformin 1000 mg twice daily (lunch and dinner)  Glimepiride 4 mg twice daily (breakfast and lunch)    Clarifications to above regimen: N/A  Adverse Effects: weight loss with metformin    Past diabetic medications include:  Rybelsus (weight loss/cost)  Jardiance (cost)    Glucose Readings:  Patient tests BG 1 time per day    Current home BG readings: 162-184  Previous home BG readings: 118-175    Any episodes of hypoglycemia? No.    Did patient treat episode of hypoglycemia appropriately? N/A  Does the patient have a prescription for ready-to-use Glucagon? Not on insulin  Does pt have proteinuria? No    Lifestyle:  Diet: Lean protein, vegetables, rice  Physical Activity: works out at the gym every day for 30 minutes    Secondary Prevention:  Statin? Yes  ACE-I/ARB? Yes  Aspirin? Yes    Pertinent PMH Review:  PMH of Pancreatitis: No  PMH of Retinopathy: No  PMH of Urinary Tract Infections: No  PMH of MTC: No     Drug Interactions  No relevant drug interactions were noted.    Medication System Management  Patient's preferred pharmacy: [facility]  Adherence/Organization: No issues  Affordability/Accessibility: Coverage gap makes brand name medications expensive, but patient does not want to pursue  PAP.    Objective   No Known Allergies  Social History     Social History Narrative    Not on file      Medication Review  Current Outpatient Medications   Medication Instructions    aspirin 81 mg EC tablet 1 tablet, oral, Daily    atorvastatin (LIPITOR) 40 mg, oral, Daily    empagliflozin  (JARDIANCE) 25 mg, oral, Daily    glimepiride (AMARYL) 4 mg, oral, 2 times daily before meals    lisinopril 10 mg, oral, Daily    metFORMIN (GLUCOPHAGE) 1,000 mg, oral, 2 times daily (morning and late afternoon)    multivitamin tablet 1 tablet, oral, Daily      Vitals  BP Readings from Last 2 Encounters:   08/21/24 123/72   11/13/23 110/50     BMI Readings from Last 1 Encounters:   08/21/24 20.73 kg/m²      Labs  A1C  Lab Results   Component Value Date    HGBA1C 7.6 (H) 12/03/2024    HGBA1C 6.9 (H) 06/11/2024    HGBA1C 8.3 (H) 03/13/2024     BMP  Lab Results   Component Value Date    CALCIUM 9.4 06/11/2024     (L) 06/11/2024    K 4.6 06/11/2024    CO2 27 06/11/2024     06/11/2024    BUN 16 06/11/2024    CREATININE 0.84 06/11/2024    EGFR >90 06/11/2024     LFTs  Lab Results   Component Value Date    ALT 24 11/10/2023    AST 11 11/10/2023    ALKPHOS 92 11/10/2023    BILITOT 0.5 11/10/2023     FLP  Lab Results   Component Value Date    TRIG 121 11/10/2023    CHOL 145 11/10/2023    LDLF 58 09/15/2020    LDLCALC 80 11/10/2023    HDL 40.4 11/10/2023     Urine Microalbumin  Lab Results   Component Value Date    MICROALBCREA  12/03/2024      Comment:      One or more analytes used in this calculation is outside of the analytical measurement range. Calculation cannot be performed.     Weight Management  Wt Readings from Last 3 Encounters:   08/21/24 56.5 kg (124 lb 9.6 oz)   11/13/23 57.2 kg (126 lb 3.2 oz)   03/06/23 57.1 kg (125 lb 12.8 oz)      There is no height or weight on file to calculate BMI.     Assessment/Plan   Problem List Items Addressed This Visit       Diabetes mellitus (Multi)     Patient's goal A1c is < 7%.  Is pt at goal? No, A1C wang to 7.6%  Patient's SMBGs are above fasting goal of  since he ran out of Jardiance. They were in the 200s. Patient doubled up on glimepiride for the past 2 weeks without significant improvement of blood sugars. We will meet again in two weeks to give him  more time to adjust to the current regimen and this author will look into Brenzavvy as an alternative to Jardiance in the meantime.    Medication Changes:  CONTINUE:  Metformin 1000 mg twice daily  Glimepiride 4 mg twice daily         Relevant Medications    glimepiride (AmaryL) 4 mg tablet    Other Relevant Orders    Referral to Clinical Pharmacy     Clinical Pharmacist follow-up: 2/5/25, Telehealth visit    Continue all meds under the continuation of care with the referring provider and clinical pharmacy team.    Thank you,  Vira Vicente, PharmD  Clinical Pharmacist  [phone]    Verbal consent to manage patient's drug therapy was obtained from the patient. They were informed they may decline to participate or withdraw from participation in pharmacy services at any time.

## 2025-01-23 NOTE — ASSESSMENT & PLAN NOTE
Patient's goal A1c is < 7%.  Is pt at goal? No, A1C wang to 7.6%  Patient's SMBGs are above fasting goal of  since he ran out of Jardiance. They were in the 200s. Patient doubled up on glimepiride for the past 2 weeks without significant improvement of blood sugars. We will meet again in two weeks to give him more time to adjust to the current regimen and this author will look into Brenzavvy as an alternative to Jardiance in the meantime.    Medication Changes:  CONTINUE:  Metformin 1000 mg twice daily  Glimepiride 4 mg twice daily

## 2025-02-05 ENCOUNTER — APPOINTMENT (OUTPATIENT)
Dept: PHARMACY | Facility: HOSPITAL | Age: 70
End: 2025-02-05
Payer: COMMERCIAL

## 2025-02-05 DIAGNOSIS — E11.69 TYPE 2 DIABETES MELLITUS WITH OTHER SPECIFIED COMPLICATION, WITHOUT LONG-TERM CURRENT USE OF INSULIN: ICD-10-CM

## 2025-02-05 RX ORDER — BEXAGLIFLOZIN 20 MG
1 TABLET ORAL DAILY
Qty: 30 TABLET | Refills: 1 | Status: SHIPPED | OUTPATIENT
Start: 2025-02-05

## 2025-02-05 NOTE — ASSESSMENT & PLAN NOTE
Patient's goal A1c is < 7%.  Is pt at goal? No, A1C wang to 7.6%  Patient's SMBGs are above fasting goal of  since he ran out of Jardiance. They have stayed under 200, but the readings are still not ideal. We will proceed with starting Brenzavvy, a different SGLT2 inhibitor, in place of Jardiance. Prescription will go through [facility] and delivered to patient at an agreeable price. Counseled patient on administration and possible side effects of Brenzavvy.    Medication Changes:  CONTINUE:  Metformin 1000 mg twice daily  Glimepiride 4 mg twice daily  START:  Brenzavvy 20 mg once daily

## 2025-02-05 NOTE — PROGRESS NOTES
Clinical Pharmacy Appointment    Patient ID: Daniele Livingston is a 69 y.o. male who presents for Diabetes.    Pt is here for Follow Up appointment.     Referring Provider: Valentino Davila MD  PCP: Valentino Davila MD   Last visit with PCP: 8/21/24   Next visit with PCP: To be scheduled    Subjective     HPI  DIABETES MELLITUS TYPE 2:    Does patient follow with Endocrinology: No     Current diabetic medications include:  Metformin 1000 mg twice daily (lunch and dinner)  Glimepiride 4 mg twice daily (breakfast and lunch)    Clarifications to above regimen: N/A  Adverse Effects: weight loss with metformin    Past diabetic medications include:  Rybelsus (weight loss/cost)  Jardiance (cost)    Glucose Readings:  Patient tests BG 1 time per day    Current home BG readings: 162 today, recently 170-200  Previous home BG readings: 162-184    Any episodes of hypoglycemia? No.    Did patient treat episode of hypoglycemia appropriately? N/A  Does the patient have a prescription for ready-to-use Glucagon? Not on insulin  Does pt have proteinuria? No    Lifestyle:  Diet: Lean protein, vegetables, rice  Physical Activity: works out at the gym every day for 30 minutes    Secondary Prevention:  Statin? Yes  ACE-I/ARB? Yes  Aspirin? Yes    Pertinent PMH Review:  PMH of Pancreatitis: No  PMH of Retinopathy: No  PMH of Urinary Tract Infections: No  PMH of MTC: No     Drug Interactions  No relevant drug interactions were noted.    Medication System Management  Patient's preferred pharmacy: [facility]  Adherence/Organization: No issues  Affordability/Accessibility: Jardiance and Rybelsus were costly    Objective   No Known Allergies  Social History     Social History Narrative    Not on file      Medication Review  Current Outpatient Medications   Medication Instructions    aspirin 81 mg EC tablet 1 tablet, oral, Daily    atorvastatin (LIPITOR) 40 mg, oral, Daily    bexagliflozin (Brenzavvy) 20 mg tablet 1 tablet, oral, Daily     glimepiride (AMARYL) 4 mg, oral, 2 times daily before meals    lisinopril 10 mg, oral, Daily    metFORMIN (GLUCOPHAGE) 1,000 mg, oral, 2 times daily (morning and late afternoon)    multivitamin tablet 1 tablet, oral, Daily      Vitals  BP Readings from Last 2 Encounters:   08/21/24 123/72   11/13/23 110/50     BMI Readings from Last 1 Encounters:   08/21/24 20.73 kg/m²      Labs  A1C  Lab Results   Component Value Date    HGBA1C 7.6 (H) 12/03/2024    HGBA1C 6.9 (H) 06/11/2024    HGBA1C 8.3 (H) 03/13/2024     BMP  Lab Results   Component Value Date    CALCIUM 9.4 06/11/2024     (L) 06/11/2024    K 4.6 06/11/2024    CO2 27 06/11/2024     06/11/2024    BUN 16 06/11/2024    CREATININE 0.84 06/11/2024    EGFR >90 06/11/2024     LFTs  Lab Results   Component Value Date    ALT 24 11/10/2023    AST 11 11/10/2023    ALKPHOS 92 11/10/2023    BILITOT 0.5 11/10/2023     FLP  Lab Results   Component Value Date    TRIG 121 11/10/2023    CHOL 145 11/10/2023    LDLF 58 09/15/2020    LDLCALC 80 11/10/2023    HDL 40.4 11/10/2023     Urine Microalbumin  Lab Results   Component Value Date    MICROALBCREA  12/03/2024      Comment:      One or more analytes used in this calculation is outside of the analytical measurement range. Calculation cannot be performed.     Weight Management  Wt Readings from Last 3 Encounters:   08/21/24 56.5 kg (124 lb 9.6 oz)   11/13/23 57.2 kg (126 lb 3.2 oz)   03/06/23 57.1 kg (125 lb 12.8 oz)      There is no height or weight on file to calculate BMI.     Assessment/Plan   Problem List Items Addressed This Visit       Diabetes mellitus (Multi)     Patient's goal A1c is < 7%.  Is pt at goal? No, A1C wang to 7.6%  Patient's SMBGs are above fasting goal of  since he ran out of Jardiance. They have stayed under 200, but the readings are still not ideal. We will proceed with starting Brenzavvy, a different SGLT2 inhibitor, in place of Jardiance. Prescription will go through [facility] and delivered to patient at an agreeable price. Counseled patient on administration and possible side effects of Brenzavvy.    Medication Changes:  CONTINUE:  Metformin 1000 mg twice daily  Glimepiride 4 mg twice daily  START:  Brenzavvy 20 mg once daily         Relevant Medications    bexagliflozin (Brenzavvy) 20 mg tablet    Other Relevant Orders    Referral to Clinical Pharmacy     Clinical Pharmacist follow-up: 3/5/25, Telehealth visit    Continue all meds under the continuation of care with the referring provider and clinical pharmacy team.    Thank you,  Vira Vicente, PharmD  Clinical Pharmacist  [phone]    Verbal consent to manage patient's drug therapy was obtained from the patient. They were informed they may decline to participate or withdraw from participation in pharmacy services at any time.

## 2025-02-10 ENCOUNTER — TELEPHONE (OUTPATIENT)
Dept: PRIMARY CARE | Facility: CLINIC | Age: 70
End: 2025-02-10
Payer: COMMERCIAL

## 2025-03-05 ENCOUNTER — APPOINTMENT (OUTPATIENT)
Dept: PHARMACY | Facility: HOSPITAL | Age: 70
End: 2025-03-05
Payer: COMMERCIAL

## 2025-03-05 DIAGNOSIS — E11.69 TYPE 2 DIABETES MELLITUS WITH OTHER SPECIFIED COMPLICATION, WITHOUT LONG-TERM CURRENT USE OF INSULIN: ICD-10-CM

## 2025-03-05 NOTE — PROGRESS NOTES
Clinical Pharmacy Appointment    Patient ID: Daniele Livingston is a 69 y.o. male who presents for Diabetes.    Pt is here for Follow Up appointment.     Referring Provider: Valentino Davila MD  PCP: Valentino Davila MD   Last visit with PCP: 8/21/24   Next visit with PCP: To be scheduled in August    Subjective   HPI  DIABETES MELLITUS TYPE 2:    Does patient follow with Endocrinology: No  Last optometry exam: N/A     Current diabetic medications include:  Metformin 1000 mg twice daily (lunch and dinner)  Glimepiride 4 mg twice daily (breakfast and lunch)  Brenzavvy 20 mg daily    Clarifications to above regimen: N/A  Adverse Effects: weight loss with metformin    Past diabetic medications include:  Rybelsus (weight loss/cost)  Jardiance (cost)    Glucose Readings:  Patient tests BG 1 time per day (fasting)    Current home BG readings: 129 today, 156 yesterday, 186 a few days ago  Previous home BG readings: 162 today, recently 170-200    Any episodes of hypoglycemia? No.    Did patient treat episode of hypoglycemia appropriately? N/A  Does the patient have a prescription for ready-to-use Glucagon? Not on insulin  Does pt have proteinuria? No    Lifestyle:  Diet: Lean protein, vegetables, rice  Physical Activity: works out at the gym every day for 30 minutes    Secondary Prevention:  Statin? Yes  ACE-I/ARB? Yes  Aspirin? Yes    Pertinent PMH Review:  PMH of Pancreatitis: No  PMH of Retinopathy: No  PMH of Urinary Tract Infections: No  PMH of MTC: No     Drug Interactions  No relevant drug interactions were noted.    Medication System Management  Patient's preferred pharmacy: [facility]  Adherence/Organization: No issues  Affordability/Accessibility: Jardiance and Rybelsus were costly    Objective   No Known Allergies  Social History     Social History Narrative    Not on file      Medication Review  Current Outpatient Medications   Medication Instructions    aspirin 81 mg EC tablet 1 tablet, oral, Daily     atorvastatin (LIPITOR) 40 mg, oral, Daily    bexagliflozin (Brenzavvy) 20 mg tablet 1 tablet, oral, Daily    glimepiride (AMARYL) 4 mg, oral, 2 times daily before meals    lisinopril 10 mg, oral, Daily    metFORMIN (GLUCOPHAGE) 1,000 mg, oral, 2 times daily (morning and late afternoon)    multivitamin tablet 1 tablet, oral, Daily      Vitals  BP Readings from Last 2 Encounters:   08/21/24 123/72   11/13/23 110/50     BMI Readings from Last 1 Encounters:   08/21/24 20.73 kg/m²      Labs  A1C  Lab Results   Component Value Date    HGBA1C 7.6 (H) 12/03/2024    HGBA1C 6.9 (H) 06/11/2024    HGBA1C 8.3 (H) 03/13/2024     BMP  Lab Results   Component Value Date    CALCIUM 9.4 06/11/2024     (L) 06/11/2024    K 4.6 06/11/2024    CO2 27 06/11/2024     06/11/2024    BUN 16 06/11/2024    CREATININE 0.84 06/11/2024    EGFR >90 06/11/2024     LFTs  Lab Results   Component Value Date    ALT 24 11/10/2023    AST 11 11/10/2023    ALKPHOS 92 11/10/2023    BILITOT 0.5 11/10/2023     FLP  Lab Results   Component Value Date    TRIG 121 11/10/2023    CHOL 145 11/10/2023    LDLF 58 09/15/2020    LDLCALC 80 11/10/2023    HDL 40.4 11/10/2023     Urine Microalbumin  Lab Results   Component Value Date    MICROALBCREA  12/03/2024      Comment:      One or more analytes used in this calculation is outside of the analytical measurement range. Calculation cannot be performed.     Weight Management  Wt Readings from Last 3 Encounters:   08/21/24 56.5 kg (124 lb 9.6 oz)   11/13/23 57.2 kg (126 lb 3.2 oz)   03/06/23 57.1 kg (125 lb 12.8 oz)      There is no height or weight on file to calculate BMI.     Assessment/Plan   Problem List Items Addressed This Visit       Diabetes mellitus (Multi)     Patient's goal A1c is < 7%.  Is pt at goal? No, A1C wang to 7.6%  Patient's last SMBG was within goal of . Patient has been taking Brenzavvy for about a week and a half without issues. Blood sugars have been trending toward goal with most  recent reading at goal. Patient's weight has been consistent over the past few months too. Will continue current regimen and discuss getting labs after next appointment. Recommended patient schedule his annual PCP visit for August.    Medication Changes:  CONTINUE:  Metformin 1000 mg twice daily  Glimepiride 4 mg twice daily  Brenzavvy 20 mg once daily         Relevant Orders    Referral to Clinical Pharmacy     Clinical Pharmacist follow-up: 4/2/25, Telehealth visit    Continue all meds under the continuation of care with the referring provider and clinical pharmacy team.    Thank you,  Vira Vicente, PharmD  Clinical Pharmacist  [phone]    Verbal consent to manage patient's drug therapy was obtained from the patient. They were informed they may decline to participate or withdraw from participation in pharmacy services at any time.

## 2025-03-06 NOTE — ASSESSMENT & PLAN NOTE
Patient's goal A1c is < 7%.  Is pt at goal? No, A1C wang to 7.6%  Patient's last SMBG was within goal of . Patient has been taking Brenzavvy for about a week and a half without issues. Blood sugars have been trending toward goal with most recent reading at goal. Patient's weight has been consistent over the past few months too. Will continue current regimen and discuss getting labs after next appointment. Recommended patient schedule his annual PCP visit for August.    Medication Changes:  CONTINUE:  Metformin 1000 mg twice daily  Glimepiride 4 mg twice daily  Brenzavvy 20 mg once daily

## 2025-03-07 ENCOUNTER — TELEPHONE (OUTPATIENT)
Dept: PRIMARY CARE | Facility: CLINIC | Age: 70
End: 2025-03-07

## 2025-04-02 ENCOUNTER — APPOINTMENT (OUTPATIENT)
Dept: PHARMACY | Facility: HOSPITAL | Age: 70
End: 2025-04-02
Payer: COMMERCIAL

## 2025-04-02 DIAGNOSIS — E11.69 TYPE 2 DIABETES MELLITUS WITH OTHER SPECIFIED COMPLICATION, WITHOUT LONG-TERM CURRENT USE OF INSULIN: ICD-10-CM

## 2025-04-02 RX ORDER — BEXAGLIFLOZIN 20 MG
1 TABLET ORAL DAILY
Qty: 30 TABLET | Refills: 5 | Status: SHIPPED | OUTPATIENT
Start: 2025-04-02

## 2025-04-02 NOTE — PROGRESS NOTES
Clinical Pharmacy Appointment    Patient ID: Daniele Livingston is a 69 y.o. male who presents for Diabetes.    Pt is here for Follow Up appointment.     Referring Provider: Valentino Davila MD  PCP: Valentino Davila MD   Last visit with PCP: 8/21/24   Next visit with PCP: 8/22/25    Subjective   HPI  DIABETES MELLITUS TYPE 2:    Does patient follow with Endocrinology: No  Last optometry exam: N/A     Current diabetic medications include:  Metformin 1000 mg twice daily (lunch and dinner)  Glimepiride 4 mg twice daily (breakfast and lunch)  Brenzavvy 20 mg daily    Clarifications to above regimen: N/A  Adverse Effects: weight loss with metformin    Past diabetic medications include:  Rybelsus (weight loss/cost)  Jardiance (cost)    Glucose Readings:  Patient tests BG 1-2 times per day    Current home BG readings: 120-155 fasting, 130-140 2 hours post prandial  Previous home BG readings: 129 today, 156 yesterday, 186 a few days ago    Any episodes of hypoglycemia? No.    Did patient treat episode of hypoglycemia appropriately? N/A  Does the patient have a prescription for ready-to-use Glucagon? Not on insulin  Does pt have proteinuria? No    Lifestyle:  Diet: Lean protein, vegetables, rice, salad  Physical Activity: works out at the gym every day - 30 minutes on machines and a 2 mile walk    Secondary Prevention:  Statin? Yes  ACE-I/ARB? Yes  Aspirin? Yes    Pertinent PMH Review:  PMH of Pancreatitis: No  PMH of Retinopathy: No  PMH of Urinary Tract Infections: No  PMH of MTC: No     Drug Interactions  No relevant drug interactions were noted.    Medication System Management  Patient's preferred pharmacy: [facility]  Adherence/Organization: No issues  Affordability/Accessibility: Jardiance and Rybelsus were costly    Objective   No Known Allergies  Social History     Social History Narrative    Not on file      Medication Review  Current Outpatient Medications   Medication Instructions    aspirin 81 mg EC tablet 1  tablet, oral, Daily    atorvastatin (LIPITOR) 40 mg, oral, Daily    bexagliflozin (Brenzavvy) 20 mg tablet 1 tablet, oral, Daily    glimepiride (AMARYL) 4 mg, oral, 2 times daily before meals    lisinopril 10 mg, oral, Daily    metFORMIN (GLUCOPHAGE) 1,000 mg, oral, 2 times daily (morning and late afternoon)    multivitamin tablet 1 tablet, oral, Daily      Vitals  BP Readings from Last 2 Encounters:   08/21/24 123/72   11/13/23 110/50     BMI Readings from Last 1 Encounters:   08/21/24 20.73 kg/m²      Labs  A1C  Lab Results   Component Value Date    HGBA1C 7.6 (H) 12/03/2024    HGBA1C 6.9 (H) 06/11/2024    HGBA1C 8.3 (H) 03/13/2024     BMP  Lab Results   Component Value Date    CALCIUM 9.4 06/11/2024     (L) 06/11/2024    K 4.6 06/11/2024    CO2 27 06/11/2024     06/11/2024    BUN 16 06/11/2024    CREATININE 0.84 06/11/2024    EGFR >90 06/11/2024     LFTs  Lab Results   Component Value Date    ALT 24 11/10/2023    AST 11 11/10/2023    ALKPHOS 92 11/10/2023    BILITOT 0.5 11/10/2023     FLP  Lab Results   Component Value Date    TRIG 121 11/10/2023    CHOL 145 11/10/2023    LDLF 58 09/15/2020    LDLCALC 80 11/10/2023    HDL 40.4 11/10/2023     Urine Microalbumin  Lab Results   Component Value Date    MICROALBCREA  12/03/2024      Comment:      One or more analytes used in this calculation is outside of the analytical measurement range. Calculation cannot be performed.     Weight Management  Wt Readings from Last 3 Encounters:   08/21/24 56.5 kg (124 lb 9.6 oz)   11/13/23 57.2 kg (126 lb 3.2 oz)   03/06/23 57.1 kg (125 lb 12.8 oz)      There is no height or weight on file to calculate BMI.     Assessment/Plan   Problem List Items Addressed This Visit       Diabetes mellitus (Multi)     Patient's goal A1c is < 7%.  Is pt at goal? No, A1C wang to 7.6%  Patient's SMBGs have been well controlled. Most fasting readings are between  with occasional higher numbers if patient eats rice with dinner. Patient  has been taking Brenzavvy for about 5 weeks without issues. Patient's weight has been consistent over the past few months too. Will continue current regimen and will get labs in about one month. Follow up in 6 weeks after labs.    Medication Changes:  CONTINUE:  Metformin 1000 mg twice daily  Glimepiride 4 mg twice daily  Brenzavvy 20 mg once daily         Relevant Medications    bexagliflozin (Brenzavvy) 20 mg tablet    Other Relevant Orders    Referral to Clinical Pharmacy    Hemoglobin A1C    Lipid panel    Basic Metabolic Panel     Clinical Pharmacist follow-up: 5/14/25, Telehealth visit    Continue all meds under the continuation of care with the referring provider and clinical pharmacy team.    Thank you,  Vira Vicente, PharmD  Clinical Pharmacist  [phone]    Verbal consent to manage patient's drug therapy was obtained from the patient. They were informed they may decline to participate or withdraw from participation in pharmacy services at any time.

## 2025-04-02 NOTE — ASSESSMENT & PLAN NOTE
Patient's goal A1c is < 7%.  Is pt at goal? No, A1C wang to 7.6%  Patient's SMBGs have been well controlled. Most fasting readings are between  with occasional higher numbers if patient eats rice with dinner. Patient has been taking Brenzavvy for about 5 weeks without issues. Patient's weight has been consistent over the past few months too. Will continue current regimen and will get labs in about one month. Follow up in 6 weeks after labs.    Medication Changes:  CONTINUE:  Metformin 1000 mg twice daily  Glimepiride 4 mg twice daily  Brenzavvy 20 mg once daily

## 2025-04-30 LAB
ANION GAP SERPL CALCULATED.4IONS-SCNC: 11 MMOL/L (CALC) (ref 7–17)
BUN SERPL-MCNC: 14 MG/DL (ref 7–25)
BUN/CREAT SERPL: ABNORMAL (CALC) (ref 6–22)
CALCIUM SERPL-MCNC: 9.7 MG/DL (ref 8.6–10.3)
CHLORIDE SERPL-SCNC: 96 MMOL/L (ref 98–110)
CHOLEST SERPL-MCNC: 122 MG/DL
CHOLEST/HDLC SERPL: 2.8 (CALC)
CO2 SERPL-SCNC: 27 MMOL/L (ref 20–32)
CREAT SERPL-MCNC: 0.81 MG/DL (ref 0.7–1.28)
EGFRCR SERPLBLD CKD-EPI 2021: 94 ML/MIN/1.73M2
EST. AVERAGE GLUCOSE BLD GHB EST-MCNC: 163 MG/DL
EST. AVERAGE GLUCOSE BLD GHB EST-SCNC: 9 MMOL/L
GLUCOSE SERPL-MCNC: 143 MG/DL (ref 65–99)
HBA1C MFR BLD: 7.3 %
HDLC SERPL-MCNC: 44 MG/DL
LDLC SERPL CALC-MCNC: 59 MG/DL (CALC)
NONHDLC SERPL-MCNC: 78 MG/DL (CALC)
POTASSIUM SERPL-SCNC: 4.7 MMOL/L (ref 3.5–5.3)
SODIUM SERPL-SCNC: 134 MMOL/L (ref 135–146)
TRIGL SERPL-MCNC: 103 MG/DL

## 2025-05-09 NOTE — PROGRESS NOTES
Clinical Pharmacy Appointment    Patient ID: Daniele Livingston is a 69 y.o. male who presents for Diabetes.    Pt is here for Follow Up appointment.     Referring Provider: Valentino Davila MD  PCP: Valentino Davila MD   Last visit with PCP: 8/21/24   Next visit with PCP: 8/22/25    Subjective   HPI  DIABETES MELLITUS TYPE 2:    Does patient follow with Endocrinology: No  Last optometry exam: N/A     Current diabetic medications include:  Metformin 1000 mg twice daily (lunch and dinner)  Glimepiride 4 mg twice daily (breakfast and lunch)  Brenzavvy 20 mg daily    Clarifications to above regimen: N/A  Adverse Effects: weight loss with metformin    Past diabetic medications include:  Rybelsus (weight loss/cost)  Jardiance (cost)    Glucose Readings:  Patient tests BG 1-2 times per day    Current home BG readings: 107-140 fasting, 150-160  Previous home BG readings: 120-155 fasting, 130-140 2 hours post prandial    Any episodes of hypoglycemia? No.    Did patient treat episode of hypoglycemia appropriately? N/A  Does the patient have a prescription for ready-to-use Glucagon? Not on insulin  Does pt have proteinuria? No    Lifestyle:  Diet: Lean protein, vegetables, rice, salad  Physical Activity: works out at the gym every day - 30 minutes on machines and a 3 mile walk    Secondary Prevention:  Statin? Yes  ACE-I/ARB? Yes  Aspirin? Yes    Pertinent PMH Review:  PMH of Pancreatitis: No  PMH of Retinopathy: No  PMH of Urinary Tract Infections: No  PMH of MTC: No     Drug Interactions  No relevant drug interactions were noted.    Medication System Management  Patient's preferred pharmacy: [facility]  Adherence/Organization: No issues  Affordability/Accessibility: Jardiance and Rybelsus were costly    Objective   No Known Allergies  Social History     Social History Narrative    Not on file      Medication Review  Current Outpatient Medications   Medication Instructions    aspirin 81 mg EC tablet 1 tablet, oral, Daily     atorvastatin (LIPITOR) 40 mg, oral, Daily    bexagliflozin (Brenzavvy) 20 mg tablet 1 tablet, oral, Daily    glimepiride (AMARYL) 4 mg, oral, 2 times daily before meals    lisinopril 10 mg, oral, Daily    metFORMIN (GLUCOPHAGE) 1,000 mg, oral, 2 times daily (morning and late afternoon)    multivitamin tablet 1 tablet, oral, Daily      Vitals  BP Readings from Last 2 Encounters:   08/21/24 123/72   11/13/23 110/50     BMI Readings from Last 1 Encounters:   08/21/24 20.73 kg/m²      Labs  A1C  Lab Results   Component Value Date    HGBA1C 7.3 (H) 04/29/2025    HGBA1C 7.6 (H) 12/03/2024    HGBA1C 6.9 (H) 06/11/2024     BMP  Lab Results   Component Value Date    CALCIUM 9.7 04/29/2025     (L) 04/29/2025    K 4.7 04/29/2025    CO2 27 04/29/2025    CL 96 (L) 04/29/2025    BUN 14 04/29/2025    CREATININE 0.81 04/29/2025    EGFR 94 04/29/2025     LFTs  Lab Results   Component Value Date    ALT 24 11/10/2023    AST 11 11/10/2023    ALKPHOS 92 11/10/2023    BILITOT 0.5 11/10/2023     FLP  Lab Results   Component Value Date    TRIG 103 04/29/2025    CHOL 122 04/29/2025    LDLF 58 09/15/2020    LDLCALC 59 04/29/2025    HDL 44 04/29/2025     Urine Microalbumin  Lab Results   Component Value Date    MICROALBCREA  12/03/2024      Comment:      One or more analytes used in this calculation is outside of the analytical measurement range. Calculation cannot be performed.     Weight Management  Wt Readings from Last 3 Encounters:   08/21/24 56.5 kg (124 lb 9.6 oz)   11/13/23 57.2 kg (126 lb 3.2 oz)   03/06/23 57.1 kg (125 lb 12.8 oz)      There is no height or weight on file to calculate BMI.     Assessment/Plan   Problem List Items Addressed This Visit       Diabetes mellitus (Multi)    Patient's goal A1c is < 7%.  Is pt at goal? No, A1C was 7.3% - improved from 7.6%.  Patient's SMBGs have been well controlled with more fasting readings at goal of . Post prandial readings are slightly higher than before, but they are  still within goal of <180. Patient is tolerating current medication regimen and weight has been stable. Continue current regimen.    Medication Changes:  CONTINUE:  Metformin 1000 mg twice daily  Glimepiride 4 mg twice daily  Brenzavvy 20 mg once daily         Relevant Orders    Referral to Clinical Pharmacy     Clinical Pharmacist follow-up: 7/2/25, Telehealth visit    Continue all meds under the continuation of care with the referring provider and clinical pharmacy team.    Thank you,  Vira Vicente, PharmD  Clinical Pharmacist  [phone]    Verbal consent to manage patient's drug therapy was obtained from the patient. They were informed they may decline to participate or withdraw from participation in pharmacy services at any time.

## 2025-05-14 ENCOUNTER — APPOINTMENT (OUTPATIENT)
Dept: PHARMACY | Facility: HOSPITAL | Age: 70
End: 2025-05-14
Payer: COMMERCIAL

## 2025-05-14 DIAGNOSIS — E11.69 TYPE 2 DIABETES MELLITUS WITH OTHER SPECIFIED COMPLICATION, WITHOUT LONG-TERM CURRENT USE OF INSULIN: ICD-10-CM

## 2025-05-14 NOTE — ASSESSMENT & PLAN NOTE
Patient's goal A1c is < 7%.  Is pt at goal? No, A1C was 7.3% - improved from 7.6%.  Patient's SMBGs have been well controlled with more fasting readings at goal of . Post prandial readings are slightly higher than before, but they are still within goal of <180. Patient is tolerating current medication regimen and weight has been stable. Continue current regimen.    Medication Changes:  CONTINUE:  Metformin 1000 mg twice daily  Glimepiride 4 mg twice daily  Brenzavvy 20 mg once daily

## 2025-06-02 DIAGNOSIS — E11.69 TYPE 2 DIABETES MELLITUS WITH OTHER SPECIFIED COMPLICATION, WITHOUT LONG-TERM CURRENT USE OF INSULIN: ICD-10-CM

## 2025-06-02 RX ORDER — METFORMIN HYDROCHLORIDE 1000 MG/1
TABLET ORAL
Qty: 180 TABLET | Refills: 1 | Status: SHIPPED | OUTPATIENT
Start: 2025-06-02

## 2025-07-02 ENCOUNTER — TELEPHONE (OUTPATIENT)
Dept: PRIMARY CARE | Facility: CLINIC | Age: 70
End: 2025-07-02

## 2025-07-02 ENCOUNTER — APPOINTMENT (OUTPATIENT)
Dept: PHARMACY | Facility: HOSPITAL | Age: 70
End: 2025-07-02
Payer: COMMERCIAL

## 2025-07-02 DIAGNOSIS — E11.69 TYPE 2 DIABETES MELLITUS WITH OTHER SPECIFIED COMPLICATION, WITHOUT LONG-TERM CURRENT USE OF INSULIN: ICD-10-CM

## 2025-07-02 RX ORDER — GLIMEPIRIDE 4 MG/1
4 TABLET ORAL
Qty: 180 TABLET | Refills: 0 | Status: SHIPPED | OUTPATIENT
Start: 2025-07-02 | End: 2025-09-30

## 2025-07-02 NOTE — ASSESSMENT & PLAN NOTE
Patient's goal A1c is < 7%.  Is pt at goal? No, A1C was 7.3% - improved from 7.6%.  Patient's SMBGs have risen slightly, which patient attributes to eating ice cream and mangoes. No changes to physical activity. He will work on moderating intake. Patient is tolerating current medication regimen and weight has been stable. Continue current regimen. Follow up in 3 months due to stable blood sugars. Will reach out sooner pending lab results and patient has my contact information for any issues.    Medication Changes:  CONTINUE:  Metformin 1000 mg twice daily  Glimepiride 4 mg twice daily  Brenzavvy 20 mg once daily

## 2025-07-02 NOTE — PROGRESS NOTES
Clinical Pharmacy Appointment    Patient ID: Daniele Livingston is a 69 y.o. male who presents for Diabetes.    Pt is here for Follow Up appointment.     Referring Provider: Valentino Davila MD  PCP: Valentino Davila MD   Last visit with PCP: 8/21/24   Next visit with PCP: 8/22/25    Subjective   HPI  DIABETES MELLITUS TYPE 2:    Does patient follow with Endocrinology: No  Last optometry exam: N/A     Current diabetic medications include:  Metformin 1000 mg twice daily (lunch and dinner)  Glimepiride 4 mg twice daily (breakfast and lunch)  Brenzavvy 20 mg daily    Clarifications to above regimen: N/A  Adverse Effects: weight loss with metformin    Past diabetic medications include:  Rybelsus (weight loss/cost)  Jardiance (cost)    Glucose Readings:  Patient tests BG 1-2 times per day    Current home BG readings: 130-155 fasting  Previous home BG readings: 107-140 fasting, 150-160 2 hours post prandial    Any episodes of hypoglycemia? No.    Did patient treat episode of hypoglycemia appropriately? N/A  Does the patient have a prescription for ready-to-use Glucagon? Not on insulin  Does pt have proteinuria? No    Lifestyle:  Diet: Lean protein, vegetables, rice, salad. Fruit in the evenings.  Physical Activity: works out at the gym every day - 30 minutes on machines and a 1.5-2 miles walk    Secondary Prevention:  Statin? Yes  ACE-I/ARB? Yes  Aspirin? Yes    Pertinent PMH Review:  PMH of Pancreatitis: No  PMH of Retinopathy: No  PMH of Urinary Tract Infections: No  PMH of MTC: No     Drug Interactions  No relevant drug interactions were noted.    Medication System Management  Patient's preferred pharmacy: [facility]  Adherence/Organization: No issues  Affordability/Accessibility: Jardiance and Rybelsus were costly    Objective   No Known Allergies  Social History     Social History Narrative    Not on file      Medication Review  Current Outpatient Medications   Medication Instructions    aspirin 81 mg EC tablet 1  tablet, oral, Daily    atorvastatin (LIPITOR) 40 mg, oral, Daily    bexagliflozin (Brenzavvy) 20 mg tablet 1 tablet, oral, Daily    glimepiride (AMARYL) 4 mg, oral, 2 times daily before meals    lisinopril 10 mg, oral, Daily    metFORMIN (Glucophage) 1,000 mg tablet TAKE 1 TABLET BY MOUTH 2 TIMES DAILY (MORNING AND LATE AFTERNOON)    multivitamin tablet 1 tablet, oral, Daily      Vitals  BP Readings from Last 2 Encounters:   08/21/24 123/72   11/13/23 110/50     BMI Readings from Last 1 Encounters:   08/21/24 20.73 kg/m²      Labs  A1C  Lab Results   Component Value Date    HGBA1C 7.3 (H) 04/29/2025    HGBA1C 7.6 (H) 12/03/2024    HGBA1C 6.9 (H) 06/11/2024     BMP  Lab Results   Component Value Date    CALCIUM 9.7 04/29/2025     (L) 04/29/2025    K 4.7 04/29/2025    CO2 27 04/29/2025    CL 96 (L) 04/29/2025    BUN 14 04/29/2025    CREATININE 0.81 04/29/2025    EGFR 94 04/29/2025     LFTs  Lab Results   Component Value Date    ALT 24 11/10/2023    AST 11 11/10/2023    ALKPHOS 92 11/10/2023    BILITOT 0.5 11/10/2023     FLP  Lab Results   Component Value Date    TRIG 103 04/29/2025    CHOL 122 04/29/2025    LDLF 58 09/15/2020    LDLCALC 59 04/29/2025    HDL 44 04/29/2025     Urine Microalbumin  Lab Results   Component Value Date    MICROALBCREA  12/03/2024      Comment:      One or more analytes used in this calculation is outside of the analytical measurement range. Calculation cannot be performed.     Weight Management  Wt Readings from Last 3 Encounters:   08/21/24 56.5 kg (124 lb 9.6 oz)   11/13/23 57.2 kg (126 lb 3.2 oz)   03/06/23 57.1 kg (125 lb 12.8 oz)      There is no height or weight on file to calculate BMI.     Assessment/Plan   Problem List Items Addressed This Visit       Diabetes mellitus (Multi)    Patient's goal A1c is < 7%.  Is pt at goal? No, A1C was 7.3% - improved from 7.6%.  Patient's SMBGs have risen slightly, which patient attributes to eating ice cream and mangoes. No changes to  physical activity. He will work on moderating intake. Patient is tolerating current medication regimen and weight has been stable. Continue current regimen. Follow up in 3 months due to stable blood sugars. Will reach out sooner pending lab results and patient has my contact information for any issues.    Medication Changes:  CONTINUE:  Metformin 1000 mg twice daily  Glimepiride 4 mg twice daily  Brenzavvy 20 mg once daily         Relevant Medications    glimepiride (AmaryL) 4 mg tablet    Other Relevant Orders    Referral to Clinical Pharmacy     Clinical Pharmacist follow-up: 10/1/25, Telehealth visit    Continue all meds under the continuation of care with the referring provider and clinical pharmacy team.    Thank you,  Vira Vicente, PharmD  Clinical Pharmacist  [phone]    Verbal consent to manage patient's drug therapy was obtained from the patient. They were informed they may decline to participate or withdraw from participation in pharmacy services at any time.

## 2025-07-15 DIAGNOSIS — I10 BENIGN ESSENTIAL HTN: ICD-10-CM

## 2025-07-15 DIAGNOSIS — I25.10 CAD, MULTIPLE VESSEL: ICD-10-CM

## 2025-07-15 DIAGNOSIS — E11.49 OTHER DIABETIC NEUROLOGICAL COMPLICATION ASSOCIATED WITH TYPE 2 DIABETES MELLITUS: ICD-10-CM

## 2025-07-15 RX ORDER — LISINOPRIL 10 MG/1
10 TABLET ORAL DAILY
Qty: 90 TABLET | Refills: 0 | Status: SHIPPED | OUTPATIENT
Start: 2025-07-15 | End: 2025-07-16

## 2025-07-16 DIAGNOSIS — I25.10 CAD, MULTIPLE VESSEL: ICD-10-CM

## 2025-07-16 DIAGNOSIS — I10 BENIGN ESSENTIAL HTN: ICD-10-CM

## 2025-07-16 DIAGNOSIS — E11.49 OTHER DIABETIC NEUROLOGICAL COMPLICATION ASSOCIATED WITH TYPE 2 DIABETES MELLITUS: ICD-10-CM

## 2025-07-16 RX ORDER — LISINOPRIL 10 MG/1
10 TABLET ORAL DAILY
Qty: 90 TABLET | Refills: 0 | Status: SHIPPED | OUTPATIENT
Start: 2025-07-16

## 2025-08-21 ASSESSMENT — ENCOUNTER SYMPTOMS
LOSS OF SENSATION IN FEET: 0
DEPRESSION: 0
OCCASIONAL FEELINGS OF UNSTEADINESS: 0
CONSTITUTIONAL NEGATIVE: 1

## 2025-08-21 ASSESSMENT — ACTIVITIES OF DAILY LIVING (ADL)
MANAGING_FINANCES: INDEPENDENT
TAKING_MEDICATION: INDEPENDENT
DRESSING: INDEPENDENT
BATHING: INDEPENDENT
DOING_HOUSEWORK: INDEPENDENT
GROCERY_SHOPPING: INDEPENDENT

## 2025-08-21 ASSESSMENT — PATIENT HEALTH QUESTIONNAIRE - PHQ9
SUM OF ALL RESPONSES TO PHQ9 QUESTIONS 1 AND 2: 0
2. FEELING DOWN, DEPRESSED OR HOPELESS: NOT AT ALL
1. LITTLE INTEREST OR PLEASURE IN DOING THINGS: NOT AT ALL

## 2025-08-22 ENCOUNTER — APPOINTMENT (OUTPATIENT)
Dept: PRIMARY CARE | Facility: CLINIC | Age: 70
End: 2025-08-22
Payer: COMMERCIAL

## 2025-08-22 VITALS
HEIGHT: 65 IN | WEIGHT: 127 LBS | DIASTOLIC BLOOD PRESSURE: 58 MMHG | HEART RATE: 61 BPM | BODY MASS INDEX: 21.16 KG/M2 | SYSTOLIC BLOOD PRESSURE: 108 MMHG | OXYGEN SATURATION: 96 %

## 2025-08-22 DIAGNOSIS — E11.69 TYPE 2 DIABETES MELLITUS WITH OTHER SPECIFIED COMPLICATION, WITHOUT LONG-TERM CURRENT USE OF INSULIN: ICD-10-CM

## 2025-08-22 DIAGNOSIS — I10 BENIGN ESSENTIAL HTN: ICD-10-CM

## 2025-08-22 DIAGNOSIS — E11.49 OTHER DIABETIC NEUROLOGICAL COMPLICATION ASSOCIATED WITH TYPE 2 DIABETES MELLITUS: ICD-10-CM

## 2025-08-22 DIAGNOSIS — Z95.1 HISTORY OF CORONARY ARTERY BYPASS SURGERY: ICD-10-CM

## 2025-08-22 DIAGNOSIS — Z12.5 SCREENING FOR PROSTATE CANCER: ICD-10-CM

## 2025-08-22 DIAGNOSIS — Z00.00 MEDICARE ANNUAL WELLNESS VISIT, SUBSEQUENT: Primary | Chronic | ICD-10-CM

## 2025-08-22 DIAGNOSIS — Z00.00 ROUTINE GENERAL MEDICAL EXAMINATION AT HEALTH CARE FACILITY: ICD-10-CM

## 2025-08-22 DIAGNOSIS — E78.5 HYPERLIPIDEMIA, UNSPECIFIED HYPERLIPIDEMIA TYPE: ICD-10-CM

## 2025-08-22 DIAGNOSIS — I25.10 CAD, MULTIPLE VESSEL: ICD-10-CM

## 2025-08-22 PROCEDURE — 1036F TOBACCO NON-USER: CPT | Performed by: INTERNAL MEDICINE

## 2025-08-22 PROCEDURE — 4010F ACE/ARB THERAPY RXD/TAKEN: CPT | Performed by: INTERNAL MEDICINE

## 2025-08-22 PROCEDURE — 3078F DIAST BP <80 MM HG: CPT | Performed by: INTERNAL MEDICINE

## 2025-08-22 PROCEDURE — 1159F MED LIST DOCD IN RCRD: CPT | Performed by: INTERNAL MEDICINE

## 2025-08-22 PROCEDURE — 1158F ADVNC CARE PLAN TLK DOCD: CPT | Performed by: INTERNAL MEDICINE

## 2025-08-22 PROCEDURE — 3074F SYST BP LT 130 MM HG: CPT | Performed by: INTERNAL MEDICINE

## 2025-08-22 PROCEDURE — 3008F BODY MASS INDEX DOCD: CPT | Performed by: INTERNAL MEDICINE

## 2025-08-22 PROCEDURE — 1160F RVW MEDS BY RX/DR IN RCRD: CPT | Performed by: INTERNAL MEDICINE

## 2025-08-22 PROCEDURE — 1170F FXNL STATUS ASSESSED: CPT | Performed by: INTERNAL MEDICINE

## 2025-08-22 PROCEDURE — 1123F ACP DISCUSS/DSCN MKR DOCD: CPT | Performed by: INTERNAL MEDICINE

## 2025-08-22 PROCEDURE — 99214 OFFICE O/P EST MOD 30 MIN: CPT | Performed by: INTERNAL MEDICINE

## 2025-08-22 PROCEDURE — G0439 PPPS, SUBSEQ VISIT: HCPCS | Performed by: INTERNAL MEDICINE

## 2025-08-22 RX ORDER — GLIMEPIRIDE 4 MG/1
4 TABLET ORAL
Qty: 180 TABLET | Refills: 0 | Status: SHIPPED | OUTPATIENT
Start: 2025-08-22 | End: 2025-11-20

## 2025-08-22 RX ORDER — BEXAGLIFLOZIN 20 MG
1 TABLET ORAL DAILY
Qty: 90 TABLET | Refills: 2 | Status: SHIPPED | OUTPATIENT
Start: 2025-08-22

## 2025-08-22 RX ORDER — METFORMIN HYDROCHLORIDE 1000 MG/1
1000 TABLET ORAL
Qty: 180 TABLET | Refills: 2 | Status: SHIPPED | OUTPATIENT
Start: 2025-08-22 | End: 2026-02-18

## 2025-08-22 RX ORDER — ATORVASTATIN CALCIUM 40 MG/1
40 TABLET, FILM COATED ORAL DAILY
Qty: 90 TABLET | Refills: 2 | Status: SHIPPED | OUTPATIENT
Start: 2025-08-22

## 2025-08-22 ASSESSMENT — PATIENT HEALTH QUESTIONNAIRE - PHQ9
1. LITTLE INTEREST OR PLEASURE IN DOING THINGS: NOT AT ALL
2. FEELING DOWN, DEPRESSED OR HOPELESS: NOT AT ALL
SUM OF ALL RESPONSES TO PHQ9 QUESTIONS 1 AND 2: 0

## 2025-08-22 ASSESSMENT — ACTIVITIES OF DAILY LIVING (ADL)
MANAGING_FINANCES: INDEPENDENT
DRESSING: INDEPENDENT
DOING_HOUSEWORK: INDEPENDENT
GROCERY_SHOPPING: INDEPENDENT
BATHING: INDEPENDENT
TAKING_MEDICATION: INDEPENDENT

## 2025-08-23 LAB — PSA SERPL-MCNC: 1.22 NG/ML

## 2025-10-01 ENCOUNTER — APPOINTMENT (OUTPATIENT)
Dept: PHARMACY | Facility: HOSPITAL | Age: 70
End: 2025-10-01
Payer: COMMERCIAL